# Patient Record
Sex: MALE | Race: WHITE | Employment: FULL TIME | URBAN - METROPOLITAN AREA
[De-identification: names, ages, dates, MRNs, and addresses within clinical notes are randomized per-mention and may not be internally consistent; named-entity substitution may affect disease eponyms.]

---

## 2023-04-04 ENCOUNTER — HOSPITAL ENCOUNTER (EMERGENCY)
Facility: HOSPITAL | Age: 53
Discharge: HOME | End: 2023-04-05
Attending: EMERGENCY MEDICINE
Payer: COMMERCIAL

## 2023-04-04 ENCOUNTER — APPOINTMENT (OUTPATIENT)
Dept: CT IMAGING | Facility: HOSPITAL | Age: 53
End: 2023-04-04
Attending: EMERGENCY MEDICINE
Payer: COMMERCIAL

## 2023-04-04 DIAGNOSIS — R31.9 HEMATURIA, UNSPECIFIED TYPE: ICD-10-CM

## 2023-04-04 DIAGNOSIS — N30.91 HEMORRHAGIC CYSTITIS: Primary | ICD-10-CM

## 2023-04-04 DIAGNOSIS — N20.0 RENAL STONE: ICD-10-CM

## 2023-04-04 DIAGNOSIS — N28.1 RENAL CYST: ICD-10-CM

## 2023-04-04 LAB
ALBUMIN SERPL-MCNC: 3.7 G/DL (ref 3.4–5)
ALBUMIN/GLOB SERPL: 1 {RATIO} (ref 1–2)
ALP LIVER SERPL-CCNC: 61 U/L
ALT SERPL-CCNC: 30 U/L
ANION GAP SERPL CALC-SCNC: 2 MMOL/L (ref 0–18)
AST SERPL-CCNC: 13 U/L (ref 15–37)
BASOPHILS # BLD AUTO: 0.06 X10(3) UL (ref 0–0.2)
BASOPHILS NFR BLD AUTO: 0.5 %
BILIRUB SERPL-MCNC: 0.4 MG/DL (ref 0.1–2)
BILIRUB UR QL STRIP.AUTO: NEGATIVE
BUN BLD-MCNC: 18 MG/DL (ref 7–18)
CALCIUM BLD-MCNC: 9.2 MG/DL (ref 8.5–10.1)
CHLORIDE SERPL-SCNC: 112 MMOL/L (ref 98–112)
CO2 SERPL-SCNC: 25 MMOL/L (ref 21–32)
CREAT BLD-MCNC: 0.93 MG/DL
EOSINOPHIL # BLD AUTO: 0.25 X10(3) UL (ref 0–0.7)
EOSINOPHIL NFR BLD AUTO: 2.1 %
ERYTHROCYTE [DISTWIDTH] IN BLOOD BY AUTOMATED COUNT: 13.6 %
GFR SERPLBLD BASED ON 1.73 SQ M-ARVRAT: 98 ML/MIN/1.73M2 (ref 60–?)
GLOBULIN PLAS-MCNC: 3.6 G/DL (ref 2.8–4.4)
GLUCOSE BLD-MCNC: 109 MG/DL (ref 70–99)
GLUCOSE UR STRIP.AUTO-MCNC: NEGATIVE MG/DL
HCT VFR BLD AUTO: 49.8 %
HGB BLD-MCNC: 16.9 G/DL
IMM GRANULOCYTES # BLD AUTO: 0.04 X10(3) UL (ref 0–1)
IMM GRANULOCYTES NFR BLD: 0.3 %
KETONES UR STRIP.AUTO-MCNC: NEGATIVE MG/DL
LYMPHOCYTES # BLD AUTO: 3.76 X10(3) UL (ref 1–4)
LYMPHOCYTES NFR BLD AUTO: 31.3 %
MCH RBC QN AUTO: 30.5 PG (ref 26–34)
MCHC RBC AUTO-ENTMCNC: 33.9 G/DL (ref 31–37)
MCV RBC AUTO: 89.7 FL
MONOCYTES # BLD AUTO: 0.89 X10(3) UL (ref 0.1–1)
MONOCYTES NFR BLD AUTO: 7.4 %
NEUTROPHILS # BLD AUTO: 7 X10 (3) UL (ref 1.5–7.7)
NEUTROPHILS # BLD AUTO: 7 X10(3) UL (ref 1.5–7.7)
NEUTROPHILS NFR BLD AUTO: 58.4 %
NITRITE UR QL STRIP.AUTO: NEGATIVE
OSMOLALITY SERPL CALC.SUM OF ELEC: 290 MOSM/KG (ref 275–295)
PH UR STRIP.AUTO: 6 [PH] (ref 5–8)
PLATELET # BLD AUTO: 299 10(3)UL (ref 150–450)
POTASSIUM SERPL-SCNC: 3.9 MMOL/L (ref 3.5–5.1)
PROT SERPL-MCNC: 7.3 G/DL (ref 6.4–8.2)
PROT UR STRIP.AUTO-MCNC: 100 MG/DL
RBC # BLD AUTO: 5.55 X10(6)UL
RBC #/AREA URNS AUTO: >10 /HPF
SODIUM SERPL-SCNC: 139 MMOL/L (ref 136–145)
SP GR UR STRIP.AUTO: 1.02 (ref 1–1.03)
UROBILINOGEN UR STRIP.AUTO-MCNC: <2 MG/DL
WBC # BLD AUTO: 12 X10(3) UL (ref 4–11)

## 2023-04-04 PROCEDURE — 81001 URINALYSIS AUTO W/SCOPE: CPT | Performed by: EMERGENCY MEDICINE

## 2023-04-04 PROCEDURE — 80053 COMPREHEN METABOLIC PANEL: CPT | Performed by: EMERGENCY MEDICINE

## 2023-04-04 PROCEDURE — 87086 URINE CULTURE/COLONY COUNT: CPT | Performed by: EMERGENCY MEDICINE

## 2023-04-04 PROCEDURE — 85025 COMPLETE CBC W/AUTO DIFF WBC: CPT

## 2023-04-04 PROCEDURE — 81001 URINALYSIS AUTO W/SCOPE: CPT

## 2023-04-04 PROCEDURE — 80053 COMPREHEN METABOLIC PANEL: CPT

## 2023-04-04 PROCEDURE — 99285 EMERGENCY DEPT VISIT HI MDM: CPT

## 2023-04-04 PROCEDURE — 85025 COMPLETE CBC W/AUTO DIFF WBC: CPT | Performed by: EMERGENCY MEDICINE

## 2023-04-04 PROCEDURE — 51798 US URINE CAPACITY MEASURE: CPT

## 2023-04-04 PROCEDURE — 36415 COLL VENOUS BLD VENIPUNCTURE: CPT

## 2023-04-04 PROCEDURE — 99284 EMERGENCY DEPT VISIT MOD MDM: CPT

## 2023-04-04 PROCEDURE — 74177 CT ABD & PELVIS W/CONTRAST: CPT | Performed by: EMERGENCY MEDICINE

## 2023-04-05 VITALS
RESPIRATION RATE: 20 BRPM | HEART RATE: 84 BPM | BODY MASS INDEX: 36.88 KG/M2 | DIASTOLIC BLOOD PRESSURE: 84 MMHG | SYSTOLIC BLOOD PRESSURE: 137 MMHG | OXYGEN SATURATION: 99 % | WEIGHT: 235 LBS | TEMPERATURE: 98 F | HEIGHT: 67 IN

## 2023-04-05 RX ORDER — SULFAMETHOXAZOLE AND TRIMETHOPRIM 800; 160 MG/1; MG/1
1 TABLET ORAL 2 TIMES DAILY
Qty: 20 TABLET | Refills: 0 | Status: SHIPPED | OUTPATIENT
Start: 2023-04-05 | End: 2023-04-15

## 2023-04-05 NOTE — ED INITIAL ASSESSMENT (HPI)
Pt presents with hematuria for 2 months intermittently. Seen at PCP today and was told to follow up with urology. Pt reports bilateral flank \"soreness\".  Denies abd pain, n/v/d, or fevers

## 2023-04-05 NOTE — Clinical Note
Use the antibiotics as prescribed. Return if you develop any worrisome symptoms such as urinary retention. Or significant pain or any other systemic complaints. Otherwise follow-up with urology by calling the office tomorrow.

## 2023-04-06 ENCOUNTER — OFFICE VISIT (OUTPATIENT)
Dept: SURGERY | Facility: CLINIC | Age: 53
End: 2023-04-06

## 2023-04-06 VITALS — HEART RATE: 81 BPM | SYSTOLIC BLOOD PRESSURE: 137 MMHG | DIASTOLIC BLOOD PRESSURE: 84 MMHG

## 2023-04-06 DIAGNOSIS — R31.0 GROSS HEMATURIA: Primary | ICD-10-CM

## 2023-04-06 PROCEDURE — 99203 OFFICE O/P NEW LOW 30 MIN: CPT | Performed by: UROLOGY

## 2023-04-06 PROCEDURE — 3075F SYST BP GE 130 - 139MM HG: CPT | Performed by: UROLOGY

## 2023-04-06 PROCEDURE — 3079F DIAST BP 80-89 MM HG: CPT | Performed by: UROLOGY

## 2023-04-11 ENCOUNTER — PROCEDURE (OUTPATIENT)
Dept: SURGERY | Facility: CLINIC | Age: 53
End: 2023-04-11

## 2023-04-11 ENCOUNTER — TELEPHONE (OUTPATIENT)
Dept: SURGERY | Facility: CLINIC | Age: 53
End: 2023-04-11

## 2023-04-11 VITALS — SYSTOLIC BLOOD PRESSURE: 132 MMHG | DIASTOLIC BLOOD PRESSURE: 88 MMHG | HEART RATE: 86 BPM

## 2023-04-11 DIAGNOSIS — D49.4 BLADDER TUMOR: Primary | ICD-10-CM

## 2023-04-11 PROCEDURE — 3075F SYST BP GE 130 - 139MM HG: CPT | Performed by: UROLOGY

## 2023-04-11 PROCEDURE — 52000 CYSTOURETHROSCOPY: CPT | Performed by: UROLOGY

## 2023-04-11 PROCEDURE — 3079F DIAST BP 80-89 MM HG: CPT | Performed by: UROLOGY

## 2023-04-11 NOTE — TELEPHONE ENCOUNTER
Hi!  This patient needs a transurethral resection of prostate. Should be booked for 2 hours in the next 1-2 weeks. Needs labs as ordered below. Thanks! [facility]    Urology Surgery Scheduling Request    Location: [address] [place] [address] OR    Surgeon: Zacarias Verdugo MD    Asst. Surgeon: N/A    Diagnosis: Bladder tumor    Procedure: Cystoscopy transurethral resection of bladder tumors    Anesthesia: General     Time Frame: 1-2 weeks    Time needed: 2 hours    Special Equipment: Resectoscope    On Call to OR: Ancef     Admission: Day Surgery    Pre-op Testing: CBC, CMP, PT/INR and Urine Culture     Need Pre-op Clearance: none    Estimated Post Op/Follow Up Appt: tbd.     Africa Adams MD

## 2023-04-11 NOTE — PROCEDURES
CYSTOSCOPY (MALE)    PRE-OP DIAGNOSIS: gross hematuria    POST-OP DIAGNOSIS: same    PROCEDURE: Cystsocopy    SURGEON: Ina Deleon MD    ASSISTANT: none     EBL: minimal    FINDINGS:   1. Urethra: No meatal stenosis, no anterior or posterior urethral strictures, open bladder neck   2. Prostate: Bilobar coaptation with mild intravesical component   3. Bladder: Bilateral ureteral orifices in orthotopic position with efflux, numerous papillary tumors along the entire dome of the bladder  4. Retroflexion: Mild intravesical component of prostate   5. Other findings: none    INDICATIONS: gross hematuria. Cytology positive. CT with IV contrast with bladder wall thickening. PROCEDURE: Patient was brought to the procedure suite and a timeout was performed identifying the patient,  and procedure being performed. The risks of the procedure were once again detailed to the patient including bleeding, infection, dysuria. The patient agreed to proceed. The patient had a negative urinalysis. No antibiotics were given to patient prior to this procedure. He was placed in a supine position on the table and a flexible cystoscope was inserted per urethra. There were no obvious urethral strictures in the anterior, membranous or posterior urethra. The prostate appeared to have mild bilobar coaptation. .  Once in the bladder we performed a full diagnostic/surveillance cystoscopy which demonstrated multiple papillary tumors along the dome of the bladder. On retroflexion we noted no abnormalities. The scope was then carefully removed and once again no urethral abnormalities were noted. There were no complications after this procedure and the patient tolerated the procedure without issue. IMPRESSION: cystoscopy with multiple papillary bladder tumors along the dome of the bladder. Will need TURBT. I discussed that transurethral resection of a bladder tumor is performed in the operating room.   We would use electrocautery to resect the abnormal mass. He may have a catheter in place after the surgery for about 7 days. The catheter would be removed in the clinic after that point. I did discuss that in some situations we would keep the patient overnight for observation. I reviewed the risks of the procedure including bleeding, severe bleeding causing clot retention and requiring take back to the operating room, infection, damage to surrounding structures (urethra, prostate, bladder, ureteral orifices), possible need for stent if the ureteral orifice were to be resected, need for additional procedures, anesthesia complications (cardio pulmonary complications), sepsis, inability to urinate/persistent urinary retention. Patient would follow-up with me in clinic to discuss the pathology results and possibly remove his catheter if it is placed in about 1 week. The patient knows that they may get the pathology results prior to my review, however understands that I will discuss these results in person. PLAN:    1. OR: transurethral resection of bladder tumor, bilateral retrograde pyleograms  2. CBC, CHEM7, INR, urine culture  3.  Hold NSAIDs

## 2023-04-11 NOTE — TELEPHONE ENCOUNTER
Spoke with patient, scheduled Cystoscopy transurethral resection of bladder tumors, Tuesday 05/30/2023, went over pre-op/lab instructions, all sent to his my chart.

## 2023-04-11 NOTE — PROGRESS NOTES
Hi!  This patient needs a transurethral resection of prostate. Should be booked for *** hours in the next 1-2 weeks. Needs labs as ordered below. Thanks! [facility]    Urology Surgery Scheduling Request    Location: [address] [place] [address] OR    Surgeon: Sammy Lozano MD    Asst. Surgeon: N/A    Diagnosis: Bladder tumor    Procedure: Cystoscopy transurethral resection of bladder tumor ***    Anesthesia: General     Time Frame: 1-2 weeks    Time needed: ***    Special Equipment: Resectoscope    On Call to OR: Anc ***    Admission: Day Surgery    Pre-op Testing: CBC, CMP, PT/INR and Urine Culture     Need Pre-op Clearance: none    Estimated Post Op/Follow Up Appt: tbd.     Evelin Aguayo MD

## 2023-04-12 ENCOUNTER — TELEPHONE (OUTPATIENT)
Dept: SURGERY | Facility: CLINIC | Age: 53
End: 2023-04-12

## 2023-04-12 DIAGNOSIS — C67.9 MALIGNANT NEOPLASM OF URINARY BLADDER, UNSPECIFIED SITE (HCC): Primary | ICD-10-CM

## 2023-04-12 NOTE — TELEPHONE ENCOUNTER
Robin Kidney, per scheduling, Thursday 04/27/2023, or  is available only at 3:30, please advise if I can add patient.

## 2023-04-12 NOTE — TELEPHONE ENCOUNTER
Spoke with patient, informed surgery to be moved to Thursday 05/11/2023, patient verbalized understanding

## 2023-04-13 NOTE — TELEPHONE ENCOUNTER
Spoke with patient, informed that surgery, moved to Thursday 04/27/2023, patient verbalized understanding.

## 2023-04-18 ENCOUNTER — LAB ENCOUNTER (OUTPATIENT)
Dept: LAB | Facility: HOSPITAL | Age: 53
End: 2023-04-18
Attending: UROLOGY
Payer: COMMERCIAL

## 2023-04-18 DIAGNOSIS — D49.4 BLADDER TUMOR: ICD-10-CM

## 2023-04-18 LAB
ANION GAP SERPL CALC-SCNC: 4 MMOL/L (ref 0–18)
BUN BLD-MCNC: 20 MG/DL (ref 7–18)
BUN/CREAT SERPL: 19.6 (ref 10–20)
CALCIUM BLD-MCNC: 9.2 MG/DL (ref 8.5–10.1)
CHLORIDE SERPL-SCNC: 110 MMOL/L (ref 98–112)
CO2 SERPL-SCNC: 27 MMOL/L (ref 21–32)
CREAT BLD-MCNC: 1.02 MG/DL
DEPRECATED RDW RBC AUTO: 45 FL (ref 35.1–46.3)
ERYTHROCYTE [DISTWIDTH] IN BLOOD BY AUTOMATED COUNT: 13.2 % (ref 11–15)
FASTING STATUS PATIENT QL REPORTED: NO
GFR SERPLBLD BASED ON 1.73 SQ M-ARVRAT: 87 ML/MIN/1.73M2 (ref 60–?)
GLUCOSE BLD-MCNC: 90 MG/DL (ref 70–99)
HCT VFR BLD AUTO: 45.6 %
HGB BLD-MCNC: 15 G/DL
INR BLD: 1.02 (ref 0.85–1.16)
MCH RBC QN AUTO: 30.1 PG (ref 26–34)
MCHC RBC AUTO-ENTMCNC: 32.9 G/DL (ref 31–37)
MCV RBC AUTO: 91.4 FL
OSMOLALITY SERPL CALC.SUM OF ELEC: 294 MOSM/KG (ref 275–295)
PLATELET # BLD AUTO: 296 10(3)UL (ref 150–450)
POTASSIUM SERPL-SCNC: 4.1 MMOL/L (ref 3.5–5.1)
PROTHROMBIN TIME: 13.3 SECONDS (ref 11.6–14.8)
RBC # BLD AUTO: 4.99 X10(6)UL
SODIUM SERPL-SCNC: 141 MMOL/L (ref 136–145)
WBC # BLD AUTO: 9.5 X10(3) UL (ref 4–11)

## 2023-04-18 PROCEDURE — 80048 BASIC METABOLIC PNL TOTAL CA: CPT

## 2023-04-18 PROCEDURE — 36415 COLL VENOUS BLD VENIPUNCTURE: CPT

## 2023-04-18 PROCEDURE — 85610 PROTHROMBIN TIME: CPT

## 2023-04-18 PROCEDURE — 85027 COMPLETE CBC AUTOMATED: CPT

## 2023-04-26 RX ORDER — LISINOPRIL 20 MG/1
20 TABLET ORAL DAILY
COMMUNITY

## 2023-04-27 ENCOUNTER — ANESTHESIA EVENT (OUTPATIENT)
Dept: SURGERY | Facility: HOSPITAL | Age: 53
End: 2023-04-27
Payer: COMMERCIAL

## 2023-04-27 ENCOUNTER — TELEPHONE (OUTPATIENT)
Dept: SURGERY | Facility: CLINIC | Age: 53
End: 2023-04-27

## 2023-04-27 ENCOUNTER — ANESTHESIA (OUTPATIENT)
Dept: SURGERY | Facility: HOSPITAL | Age: 53
End: 2023-04-27
Payer: COMMERCIAL

## 2023-04-27 ENCOUNTER — HOSPITAL ENCOUNTER (OUTPATIENT)
Facility: HOSPITAL | Age: 53
Setting detail: HOSPITAL OUTPATIENT SURGERY
Discharge: HOME | End: 2023-04-27
Attending: UROLOGY | Admitting: UROLOGY
Payer: COMMERCIAL

## 2023-04-27 VITALS
HEART RATE: 76 BPM | WEIGHT: 235 LBS | TEMPERATURE: 98 F | HEIGHT: 67 IN | RESPIRATION RATE: 16 BRPM | DIASTOLIC BLOOD PRESSURE: 84 MMHG | BODY MASS INDEX: 36.88 KG/M2 | SYSTOLIC BLOOD PRESSURE: 135 MMHG | OXYGEN SATURATION: 96 %

## 2023-04-27 DIAGNOSIS — D49.4 BLADDER TUMOR: ICD-10-CM

## 2023-04-27 LAB
GLUCOSE BLDC GLUCOMTR-MCNC: 100 MG/DL (ref 70–99)
GLUCOSE BLDC GLUCOMTR-MCNC: 113 MG/DL (ref 70–99)

## 2023-04-27 PROCEDURE — 52240 CYSTOSCOPY AND TREATMENT: CPT | Performed by: UROLOGY

## 2023-04-27 PROCEDURE — 0TBB8ZZ EXCISION OF BLADDER, VIA NATURAL OR ARTIFICIAL OPENING ENDOSCOPIC: ICD-10-PCS | Performed by: UROLOGY

## 2023-04-27 RX ORDER — MIDAZOLAM HYDROCHLORIDE 1 MG/ML
INJECTION INTRAMUSCULAR; INTRAVENOUS AS NEEDED
Status: DISCONTINUED | OUTPATIENT
Start: 2023-04-27 | End: 2023-04-27 | Stop reason: SURG

## 2023-04-27 RX ORDER — SODIUM CHLORIDE, SODIUM LACTATE, POTASSIUM CHLORIDE, CALCIUM CHLORIDE 600; 310; 30; 20 MG/100ML; MG/100ML; MG/100ML; MG/100ML
INJECTION, SOLUTION INTRAVENOUS CONTINUOUS
Status: DISCONTINUED | OUTPATIENT
Start: 2023-04-27 | End: 2023-04-27

## 2023-04-27 RX ORDER — ACETAMINOPHEN 500 MG
1000 TABLET ORAL ONCE
Status: COMPLETED | OUTPATIENT
Start: 2023-04-27 | End: 2023-04-27

## 2023-04-27 RX ORDER — ONDANSETRON 2 MG/ML
INJECTION INTRAMUSCULAR; INTRAVENOUS AS NEEDED
Status: DISCONTINUED | OUTPATIENT
Start: 2023-04-27 | End: 2023-04-27 | Stop reason: SURG

## 2023-04-27 RX ORDER — HYDROMORPHONE HYDROCHLORIDE 1 MG/ML
0.2 INJECTION, SOLUTION INTRAMUSCULAR; INTRAVENOUS; SUBCUTANEOUS EVERY 5 MIN PRN
Status: DISCONTINUED | OUTPATIENT
Start: 2023-04-27 | End: 2023-04-27

## 2023-04-27 RX ORDER — OXYBUTYNIN CHLORIDE 5 MG/1
10 TABLET ORAL ONCE AS NEEDED
Status: DISCONTINUED | OUTPATIENT
Start: 2023-04-27 | End: 2023-04-27

## 2023-04-27 RX ORDER — METOCLOPRAMIDE 10 MG/1
10 TABLET ORAL ONCE
Status: COMPLETED | OUTPATIENT
Start: 2023-04-27 | End: 2023-04-27

## 2023-04-27 RX ORDER — CEFAZOLIN SODIUM/WATER 2 G/20 ML
2 SYRINGE (ML) INTRAVENOUS ONCE
Status: COMPLETED | OUTPATIENT
Start: 2023-04-27 | End: 2023-04-27

## 2023-04-27 RX ORDER — ROCURONIUM BROMIDE 10 MG/ML
INJECTION, SOLUTION INTRAVENOUS AS NEEDED
Status: DISCONTINUED | OUTPATIENT
Start: 2023-04-27 | End: 2023-04-27 | Stop reason: SURG

## 2023-04-27 RX ORDER — NALOXONE HYDROCHLORIDE 0.4 MG/ML
80 INJECTION, SOLUTION INTRAMUSCULAR; INTRAVENOUS; SUBCUTANEOUS AS NEEDED
Status: DISCONTINUED | OUTPATIENT
Start: 2023-04-27 | End: 2023-04-27

## 2023-04-27 RX ORDER — MORPHINE SULFATE 10 MG/ML
6 INJECTION, SOLUTION INTRAMUSCULAR; INTRAVENOUS EVERY 10 MIN PRN
Status: DISCONTINUED | OUTPATIENT
Start: 2023-04-27 | End: 2023-04-27

## 2023-04-27 RX ORDER — HYDROMORPHONE HYDROCHLORIDE 1 MG/ML
0.4 INJECTION, SOLUTION INTRAMUSCULAR; INTRAVENOUS; SUBCUTANEOUS EVERY 5 MIN PRN
Status: DISCONTINUED | OUTPATIENT
Start: 2023-04-27 | End: 2023-04-27

## 2023-04-27 RX ORDER — SENNA AND DOCUSATE SODIUM 50; 8.6 MG/1; MG/1
1 TABLET, FILM COATED ORAL DAILY
Qty: 30 TABLET | Refills: 11 | Status: SHIPPED | OUTPATIENT
Start: 2023-04-27

## 2023-04-27 RX ORDER — LIDOCAINE HYDROCHLORIDE 10 MG/ML
INJECTION, SOLUTION EPIDURAL; INFILTRATION; INTRACAUDAL; PERINEURAL AS NEEDED
Status: DISCONTINUED | OUTPATIENT
Start: 2023-04-27 | End: 2023-04-27 | Stop reason: SURG

## 2023-04-27 RX ORDER — MORPHINE SULFATE 4 MG/ML
4 INJECTION, SOLUTION INTRAMUSCULAR; INTRAVENOUS EVERY 10 MIN PRN
Status: DISCONTINUED | OUTPATIENT
Start: 2023-04-27 | End: 2023-04-27

## 2023-04-27 RX ORDER — OXYBUTYNIN CHLORIDE 5 MG/1
5 TABLET ORAL 3 TIMES DAILY PRN
Qty: 42 TABLET | Refills: 0 | Status: SHIPPED | OUTPATIENT
Start: 2023-04-27 | End: 2023-05-11

## 2023-04-27 RX ORDER — SULFAMETHOXAZOLE AND TRIMETHOPRIM 800; 160 MG/1; MG/1
1 TABLET ORAL 2 TIMES DAILY
Qty: 6 TABLET | Refills: 0 | Status: SHIPPED | OUTPATIENT
Start: 2023-04-27 | End: 2023-04-30

## 2023-04-27 RX ORDER — FAMOTIDINE 20 MG/1
20 TABLET, FILM COATED ORAL ONCE
Status: COMPLETED | OUTPATIENT
Start: 2023-04-27 | End: 2023-04-27

## 2023-04-27 RX ORDER — OXYBUTYNIN CHLORIDE 5 MG/1
5 TABLET ORAL 3 TIMES DAILY
Status: DISCONTINUED | OUTPATIENT
Start: 2023-04-28 | End: 2023-04-27

## 2023-04-27 RX ORDER — HYDROMORPHONE HYDROCHLORIDE 1 MG/ML
0.6 INJECTION, SOLUTION INTRAMUSCULAR; INTRAVENOUS; SUBCUTANEOUS EVERY 5 MIN PRN
Status: DISCONTINUED | OUTPATIENT
Start: 2023-04-27 | End: 2023-04-27

## 2023-04-27 RX ORDER — DEXAMETHASONE SODIUM PHOSPHATE 4 MG/ML
VIAL (ML) INJECTION AS NEEDED
Status: DISCONTINUED | OUTPATIENT
Start: 2023-04-27 | End: 2023-04-27 | Stop reason: SURG

## 2023-04-27 RX ORDER — MORPHINE SULFATE 4 MG/ML
2 INJECTION, SOLUTION INTRAMUSCULAR; INTRAVENOUS EVERY 10 MIN PRN
Status: DISCONTINUED | OUTPATIENT
Start: 2023-04-27 | End: 2023-04-27

## 2023-04-27 RX ORDER — OXYBUTYNIN CHLORIDE 5 MG/1
5 TABLET ORAL 3 TIMES DAILY
Status: DISCONTINUED | OUTPATIENT
Start: 2023-04-27 | End: 2023-04-27

## 2023-04-27 RX ORDER — LABETALOL HYDROCHLORIDE 5 MG/ML
INJECTION, SOLUTION INTRAVENOUS AS NEEDED
Status: DISCONTINUED | OUTPATIENT
Start: 2023-04-27 | End: 2023-04-27 | Stop reason: SURG

## 2023-04-27 RX ORDER — POLYETHYLENE GLYCOL 3350 17 G/17G
17 POWDER, FOR SOLUTION ORAL DAILY PRN
Qty: 30 EACH | Refills: 11 | Status: SHIPPED | OUTPATIENT
Start: 2023-04-27

## 2023-04-27 RX ORDER — DOCUSATE SODIUM 100 MG/1
100 CAPSULE, LIQUID FILLED ORAL 2 TIMES DAILY
Qty: 30 CAPSULE | Refills: 11 | Status: SHIPPED | OUTPATIENT
Start: 2023-04-27

## 2023-04-27 RX ADMIN — DEXAMETHASONE SODIUM PHOSPHATE 8 MG: 4 MG/ML VIAL (ML) INJECTION at 16:21:00

## 2023-04-27 RX ADMIN — SODIUM CHLORIDE, SODIUM LACTATE, POTASSIUM CHLORIDE, CALCIUM CHLORIDE: 600; 310; 30; 20 INJECTION, SOLUTION INTRAVENOUS at 16:42:00

## 2023-04-27 RX ADMIN — CEFAZOLIN SODIUM/WATER 2 G: 2 G/20 ML SYRINGE (ML) INTRAVENOUS at 16:22:00

## 2023-04-27 RX ADMIN — SODIUM CHLORIDE, SODIUM LACTATE, POTASSIUM CHLORIDE, CALCIUM CHLORIDE: 600; 310; 30; 20 INJECTION, SOLUTION INTRAVENOUS at 16:21:00

## 2023-04-27 RX ADMIN — MIDAZOLAM HYDROCHLORIDE 2 MG: 1 INJECTION INTRAMUSCULAR; INTRAVENOUS at 16:09:00

## 2023-04-27 RX ADMIN — ROCURONIUM BROMIDE 20 MG: 10 INJECTION, SOLUTION INTRAVENOUS at 16:44:00

## 2023-04-27 RX ADMIN — LABETALOL HYDROCHLORIDE 10 MG: 5 INJECTION, SOLUTION INTRAVENOUS at 16:28:00

## 2023-04-27 RX ADMIN — LIDOCAINE HYDROCHLORIDE 50 MG: 10 INJECTION, SOLUTION EPIDURAL; INFILTRATION; INTRACAUDAL; PERINEURAL at 16:13:00

## 2023-04-27 RX ADMIN — SODIUM CHLORIDE, SODIUM LACTATE, POTASSIUM CHLORIDE, CALCIUM CHLORIDE: 600; 310; 30; 20 INJECTION, SOLUTION INTRAVENOUS at 16:30:00

## 2023-04-27 RX ADMIN — ONDANSETRON 4 MG: 2 INJECTION INTRAMUSCULAR; INTRAVENOUS at 17:25:00

## 2023-04-27 RX ADMIN — ROCURONIUM BROMIDE 30 MG: 10 INJECTION, SOLUTION INTRAVENOUS at 16:21:00

## 2023-04-27 NOTE — TELEPHONE ENCOUNTER
Hi all he will need a catheter removal in 10-14 days and pathology discussion with me same day    Terry Cruz

## 2023-04-27 NOTE — ANESTHESIA POSTPROCEDURE EVALUATION
Patient: Rekha Edouard    Procedure Summary     Date: 04/27/23 Room / Location: [address] [place] [address] MAIN OR 14 / [address] [place] [address] MAIN OR    Anesthesia Start: 3781 Anesthesia Stop: 7936    Procedure: Cystoscopy transurethral resection of bladder tumors Diagnosis:       Bladder tumor      (Bladder tumor [D49.4])    Surgeons: Shira Bowman MD Anesthesiologist: Noni Sandoval MD    Anesthesia Type: general ASA Status: 2          Anesthesia Type: general    Vitals Value Taken Time   /96 04/27/23 1740   Temp  04/27/23 1740   Pulse 85 04/27/23 1739   Resp 14 04/27/23 1740   SpO2 96 % 04/27/23 1739   Vitals shown include unvalidated device data.     [address] [place] [address] AN Post Evaluation:   Patient Evaluated in PACU  Patient Participation: complete - patient participated  Level of Consciousness: awake  Pain Management: adequate  Airway Patency:patent  Yes    Cardiovascular Status: acceptable  Respiratory Status: acceptable  Postoperative Hydration acceptable      Keon Harding MD  4/27/2023 5:40 PM
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
## 2023-04-27 NOTE — ANESTHESIA PROCEDURE NOTES
Airway  Date/Time: 4/27/2023 4:14 PM  Urgency: elective    Airway not difficult    General Information and Staff    Patient location during procedure: OR  Anesthesiologist: Petra Nicholson MD  Performed: anesthesiologist   Performed by: Petra Nicholson MD  Authorized by: Petra Nicholson MD      Indications and Patient Condition  Indications for airway management: anesthesia  Sedation level: deep  Preoxygenated: yes  Patient position: sniffing  Mask difficulty assessment: 1 - vent by mask    Final Airway Details  Final airway type: endotracheal airway      Successful airway: ETT  Cuffed: yes   Successful intubation technique: Video laryngoscopy  Endotracheal tube insertion site: oral  Blade: GlideScope  Blade size: #4  ETT size (mm): 7.5    Cormack-Lehane Classification: grade I - full view of glottis  Placement verified by: chest auscultation and capnometry   Cuff volume (mL): 8  Measured from: lips  ETT to lips (cm): 22  Number of attempts at approach: 1  Number of other approaches attempted: 0

## 2023-04-28 NOTE — TELEPHONE ENCOUNTER
I called pt and informed him of the orders and instructions in AR's msg as stated below and pt verbalized understanding and we arranged an N/V appt for Fri. 5/12 at 8:50 am and his discussion appt at 9 am.

## 2023-05-01 ENCOUNTER — TELEPHONE (OUTPATIENT)
Dept: SURGERY | Facility: CLINIC | Age: 53
End: 2023-05-01

## 2023-05-01 NOTE — TELEPHONE ENCOUNTER
Patients spouse requesting call back. Would like to either get a sooner appointment or get a call back with pathology results. They do not want to wait that long for results per spouse.  Please advise

## 2023-05-03 ENCOUNTER — TELEPHONE (OUTPATIENT)
Dept: SURGERY | Facility: CLINIC | Age: 53
End: 2023-05-03

## 2023-05-03 ENCOUNTER — OFFICE VISIT (OUTPATIENT)
Dept: SURGERY | Facility: CLINIC | Age: 53
End: 2023-05-03

## 2023-05-03 DIAGNOSIS — C67.9 MALIGNANT NEOPLASM OF URINARY BLADDER, UNSPECIFIED SITE (HCC): ICD-10-CM

## 2023-05-03 DIAGNOSIS — C67.9 MALIGNANT NEOPLASM OF URINARY BLADDER, UNSPECIFIED SITE (HCC): Primary | ICD-10-CM

## 2023-05-03 DIAGNOSIS — D49.4 BLADDER TUMOR: Primary | ICD-10-CM

## 2023-05-03 PROCEDURE — 99213 OFFICE O/P EST LOW 20 MIN: CPT | Performed by: UROLOGY

## 2023-05-03 NOTE — TELEPHONE ENCOUNTER
Hi!  This patient needs a transurethral resection of bladder tumor. Should be booked for 1 hours on may 19 at 3pm Needs labs as ordered below. Thanks! 309 \[facility]\""    Urology Surgery Scheduling Request    Location: [address] [place] [address] OR    Surgeon: Lyle Valdez MD    Asst. Surgeon: N/A    Diagnosis: Bladder tumor    Procedure: Cystoscopy transurethral resection of bladder tumor, restaging    Anesthesia: General     Time Frame: may 19    Time needed: 1 hour    Special Equipment: Resectoscope    On Call to OR: Ancef     Admission: Day Surgery    Pre-op Testing: Urine Culture     Need Pre-op Clearance: none    Estimated Post Op/Follow Up Appt: suzanne Suarez MD

## 2023-05-04 NOTE — TELEPHONE ENCOUNTER
Spoke with patient, scheduled Cystoscopy restaging transurethral resection of bladder tumor, Friday 05/19/2023, went over pre-op/lab instructions, all sent to patient' my chart

## 2023-05-08 ENCOUNTER — NURSE ONLY (OUTPATIENT)
Dept: SURGERY | Facility: CLINIC | Age: 53
End: 2023-05-08

## 2023-05-08 VITALS — HEART RATE: 72 BPM | DIASTOLIC BLOOD PRESSURE: 78 MMHG | SYSTOLIC BLOOD PRESSURE: 134 MMHG | RESPIRATION RATE: 16 BRPM

## 2023-05-08 DIAGNOSIS — R33.9 URINARY RETENTION: Primary | ICD-10-CM

## 2023-05-08 PROCEDURE — 51700 IRRIGATION OF BLADDER: CPT | Performed by: UROLOGY

## 2023-05-08 PROCEDURE — 3075F SYST BP GE 130 - 139MM HG: CPT | Performed by: UROLOGY

## 2023-05-08 PROCEDURE — 3078F DIAST BP <80 MM HG: CPT | Performed by: UROLOGY

## 2023-05-08 NOTE — PROGRESS NOTES
I called the pt into the exam room and introduced myself and verified the spelling of his last name and his . I explained that Allen Almendarez gave orders to conduct a voiding trial /decath and I explained the procedure and pt agreed to proceed. I then assisted the pt onto the exam table and also to lower his bottom clothing to his ankles. I then placed him on the exam table in a supine position. I proceeded to detach the leg bag and extension tubing from the arceo cath and I discarded them. I then removed the arceo cath from the STAT lock at his upper inner Lt. Thigh and I then removed the STAT lock also. I then deflated the arceo cath balloon of its contents ( 10 ml) and I cleansed the end of the arceo cath with a couple of alcohol wipes and then inserted the barrel of a sarai syringe into the arceo cath. I then proceeded to fill the pt's bladder with 130 ml of 0.9 NS via gravity at which point pt stated that he felt full and thought he could urinate. I then removed the arceo cath gently and slowly and then assisted the pt to a standing position and I gave him a graduate to urinate into and I also turned on the faucet water at a trickle to help stimulate urination. Pt was immediately able to urinate 140 ml of light red clear colored urine without blood clots with a fairly good stream.  Pt c/o only slight dysuria but he tolerated it well. I explained to pt that once he gets home he should just drink a little more than he typically would but he should not force fluids. I also asked that he call if he has any urinary difficulties. I explained that urinary retention can be in the form of no urine output at all over an extended period or small amts of urine production very frequently with eventual feeling of bladder pressure and discomfort. Pt verbalized understanding and compliance. Pt has another surgery schd for .

## 2023-05-10 ENCOUNTER — LAB ENCOUNTER (OUTPATIENT)
Dept: LAB | Facility: HOSPITAL | Age: 53
End: 2023-05-10
Attending: UROLOGY
Payer: COMMERCIAL

## 2023-05-10 DIAGNOSIS — C67.9 MALIGNANT NEOPLASM OF URINARY BLADDER, UNSPECIFIED SITE (HCC): ICD-10-CM

## 2023-05-10 DIAGNOSIS — D49.4 BLADDER TUMOR: ICD-10-CM

## 2023-05-10 PROCEDURE — 87086 URINE CULTURE/COLONY COUNT: CPT

## 2023-05-18 RX ORDER — CEFAZOLIN SODIUM/WATER 2 G/20 ML
2 SYRINGE (ML) INTRAVENOUS ONCE
Status: COMPLETED | OUTPATIENT
Start: 2023-05-19 | End: 2023-05-19

## 2023-05-18 RX ORDER — CEFAZOLIN SODIUM/WATER 2 G/20 ML
2 SYRINGE (ML) INTRAVENOUS ONCE
Status: DISCONTINUED | OUTPATIENT
Start: 2023-05-18 | End: 2023-05-18

## 2023-05-19 ENCOUNTER — TELEPHONE (OUTPATIENT)
Dept: SURGERY | Facility: CLINIC | Age: 53
End: 2023-05-19

## 2023-05-19 ENCOUNTER — ANESTHESIA (OUTPATIENT)
Dept: SURGERY | Facility: HOSPITAL | Age: 53
End: 2023-05-19
Payer: COMMERCIAL

## 2023-05-19 ENCOUNTER — HOSPITAL ENCOUNTER (OUTPATIENT)
Facility: HOSPITAL | Age: 53
Setting detail: HOSPITAL OUTPATIENT SURGERY
Discharge: HOME | End: 2023-05-19
Attending: UROLOGY | Admitting: UROLOGY
Payer: COMMERCIAL

## 2023-05-19 ENCOUNTER — ANESTHESIA EVENT (OUTPATIENT)
Dept: SURGERY | Facility: HOSPITAL | Age: 53
End: 2023-05-19
Payer: COMMERCIAL

## 2023-05-19 VITALS
BODY MASS INDEX: 36.73 KG/M2 | TEMPERATURE: 97 F | HEART RATE: 66 BPM | SYSTOLIC BLOOD PRESSURE: 134 MMHG | DIASTOLIC BLOOD PRESSURE: 84 MMHG | HEIGHT: 67 IN | RESPIRATION RATE: 14 BRPM | OXYGEN SATURATION: 100 % | WEIGHT: 234 LBS

## 2023-05-19 DIAGNOSIS — C67.9 MALIGNANT NEOPLASM OF URINARY BLADDER, UNSPECIFIED SITE (HCC): ICD-10-CM

## 2023-05-19 PROCEDURE — 52235 CYSTOSCOPY AND TREATMENT: CPT | Performed by: UROLOGY

## 2023-05-19 PROCEDURE — 0TBB8ZX EXCISION OF BLADDER, VIA NATURAL OR ARTIFICIAL OPENING ENDOSCOPIC, DIAGNOSTIC: ICD-10-PCS | Performed by: UROLOGY

## 2023-05-19 PROCEDURE — 0TBB8ZZ EXCISION OF BLADDER, VIA NATURAL OR ARTIFICIAL OPENING ENDOSCOPIC: ICD-10-PCS | Performed by: UROLOGY

## 2023-05-19 RX ORDER — ESMOLOL HYDROCHLORIDE 10 MG/ML
INJECTION INTRAVENOUS AS NEEDED
Status: DISCONTINUED | OUTPATIENT
Start: 2023-05-19 | End: 2023-05-19 | Stop reason: SURG

## 2023-05-19 RX ORDER — SULFAMETHOXAZOLE AND TRIMETHOPRIM 800; 160 MG/1; MG/1
1 TABLET ORAL 2 TIMES DAILY
Qty: 6 TABLET | Refills: 0 | Status: SHIPPED | OUTPATIENT
Start: 2023-05-19 | End: 2023-05-22

## 2023-05-19 RX ORDER — HYDROMORPHONE HYDROCHLORIDE 1 MG/ML
0.6 INJECTION, SOLUTION INTRAMUSCULAR; INTRAVENOUS; SUBCUTANEOUS EVERY 5 MIN PRN
Status: DISCONTINUED | OUTPATIENT
Start: 2023-05-19 | End: 2023-05-24

## 2023-05-19 RX ORDER — GLYCOPYRROLATE 0.2 MG/ML
INJECTION, SOLUTION INTRAMUSCULAR; INTRAVENOUS AS NEEDED
Status: DISCONTINUED | OUTPATIENT
Start: 2023-05-19 | End: 2023-05-19 | Stop reason: SURG

## 2023-05-19 RX ORDER — SODIUM CHLORIDE, SODIUM LACTATE, POTASSIUM CHLORIDE, CALCIUM CHLORIDE 600; 310; 30; 20 MG/100ML; MG/100ML; MG/100ML; MG/100ML
INJECTION, SOLUTION INTRAVENOUS CONTINUOUS
Status: DISCONTINUED | OUTPATIENT
Start: 2023-05-19 | End: 2023-05-24

## 2023-05-19 RX ORDER — MORPHINE SULFATE 4 MG/ML
2 INJECTION, SOLUTION INTRAMUSCULAR; INTRAVENOUS EVERY 10 MIN PRN
Status: DISCONTINUED | OUTPATIENT
Start: 2023-05-19 | End: 2023-05-24

## 2023-05-19 RX ORDER — MIDAZOLAM HYDROCHLORIDE 1 MG/ML
INJECTION INTRAMUSCULAR; INTRAVENOUS AS NEEDED
Status: DISCONTINUED | OUTPATIENT
Start: 2023-05-19 | End: 2023-05-19 | Stop reason: SURG

## 2023-05-19 RX ORDER — ONDANSETRON 2 MG/ML
4 INJECTION INTRAMUSCULAR; INTRAVENOUS EVERY 6 HOURS PRN
Status: DISCONTINUED | OUTPATIENT
Start: 2023-05-19 | End: 2023-05-24

## 2023-05-19 RX ORDER — ACETAMINOPHEN 500 MG
1000 TABLET ORAL ONCE
Status: COMPLETED | OUTPATIENT
Start: 2023-05-19 | End: 2023-05-19

## 2023-05-19 RX ORDER — HYDROMORPHONE HYDROCHLORIDE 1 MG/ML
0.4 INJECTION, SOLUTION INTRAMUSCULAR; INTRAVENOUS; SUBCUTANEOUS EVERY 5 MIN PRN
Status: DISCONTINUED | OUTPATIENT
Start: 2023-05-19 | End: 2023-05-24

## 2023-05-19 RX ORDER — ONDANSETRON 2 MG/ML
INJECTION INTRAMUSCULAR; INTRAVENOUS AS NEEDED
Status: DISCONTINUED | OUTPATIENT
Start: 2023-05-19 | End: 2023-05-19 | Stop reason: SURG

## 2023-05-19 RX ORDER — METOCLOPRAMIDE 10 MG/1
10 TABLET ORAL ONCE
Status: COMPLETED | OUTPATIENT
Start: 2023-05-19 | End: 2023-05-19

## 2023-05-19 RX ORDER — HYDROMORPHONE HYDROCHLORIDE 1 MG/ML
0.2 INJECTION, SOLUTION INTRAMUSCULAR; INTRAVENOUS; SUBCUTANEOUS EVERY 5 MIN PRN
Status: DISCONTINUED | OUTPATIENT
Start: 2023-05-19 | End: 2023-05-24

## 2023-05-19 RX ORDER — LABETALOL HYDROCHLORIDE 5 MG/ML
5 INJECTION, SOLUTION INTRAVENOUS EVERY 5 MIN PRN
Status: DISCONTINUED | OUTPATIENT
Start: 2023-05-19 | End: 2023-05-19

## 2023-05-19 RX ORDER — DEXAMETHASONE SODIUM PHOSPHATE 4 MG/ML
VIAL (ML) INJECTION AS NEEDED
Status: DISCONTINUED | OUTPATIENT
Start: 2023-05-19 | End: 2023-05-19 | Stop reason: SURG

## 2023-05-19 RX ORDER — NALOXONE HYDROCHLORIDE 0.4 MG/ML
80 INJECTION, SOLUTION INTRAMUSCULAR; INTRAVENOUS; SUBCUTANEOUS AS NEEDED
Status: DISCONTINUED | OUTPATIENT
Start: 2023-05-19 | End: 2023-05-19

## 2023-05-19 RX ORDER — ROCURONIUM BROMIDE 10 MG/ML
INJECTION, SOLUTION INTRAVENOUS AS NEEDED
Status: DISCONTINUED | OUTPATIENT
Start: 2023-05-19 | End: 2023-05-19 | Stop reason: SURG

## 2023-05-19 RX ORDER — MORPHINE SULFATE 10 MG/ML
6 INJECTION, SOLUTION INTRAMUSCULAR; INTRAVENOUS EVERY 10 MIN PRN
Status: DISCONTINUED | OUTPATIENT
Start: 2023-05-19 | End: 2023-05-24

## 2023-05-19 RX ORDER — FAMOTIDINE 20 MG/1
20 TABLET, FILM COATED ORAL ONCE
Status: COMPLETED | OUTPATIENT
Start: 2023-05-19 | End: 2023-05-19

## 2023-05-19 RX ORDER — MORPHINE SULFATE 4 MG/ML
4 INJECTION, SOLUTION INTRAMUSCULAR; INTRAVENOUS EVERY 10 MIN PRN
Status: DISCONTINUED | OUTPATIENT
Start: 2023-05-19 | End: 2023-05-24

## 2023-05-19 RX ORDER — PROCHLORPERAZINE EDISYLATE 5 MG/ML
5 INJECTION INTRAMUSCULAR; INTRAVENOUS EVERY 8 HOURS PRN
Status: DISCONTINUED | OUTPATIENT
Start: 2023-05-19 | End: 2023-05-24

## 2023-05-19 RX ORDER — LIDOCAINE HYDROCHLORIDE 10 MG/ML
INJECTION, SOLUTION EPIDURAL; INFILTRATION; INTRACAUDAL; PERINEURAL AS NEEDED
Status: DISCONTINUED | OUTPATIENT
Start: 2023-05-19 | End: 2023-05-19 | Stop reason: SURG

## 2023-05-19 RX ADMIN — ROCURONIUM BROMIDE 10 MG: 10 INJECTION, SOLUTION INTRAVENOUS at 10:39:00

## 2023-05-19 RX ADMIN — MIDAZOLAM HYDROCHLORIDE 2 MG: 1 INJECTION INTRAMUSCULAR; INTRAVENOUS at 10:37:00

## 2023-05-19 RX ADMIN — DEXAMETHASONE SODIUM PHOSPHATE 4 MG: 4 MG/ML VIAL (ML) INJECTION at 10:48:00

## 2023-05-19 RX ADMIN — CEFAZOLIN SODIUM/WATER 2 G: 2 G/20 ML SYRINGE (ML) INTRAVENOUS at 10:45:00

## 2023-05-19 RX ADMIN — ESMOLOL HYDROCHLORIDE 10 MG: 10 INJECTION INTRAVENOUS at 10:59:00

## 2023-05-19 RX ADMIN — LIDOCAINE HYDROCHLORIDE 50 MG: 10 INJECTION, SOLUTION EPIDURAL; INFILTRATION; INTRACAUDAL; PERINEURAL at 10:39:00

## 2023-05-19 RX ADMIN — GLYCOPYRROLATE 0.2 MG: 0.2 INJECTION, SOLUTION INTRAMUSCULAR; INTRAVENOUS at 10:48:00

## 2023-05-19 RX ADMIN — ONDANSETRON 4 MG: 2 INJECTION INTRAMUSCULAR; INTRAVENOUS at 12:00:00

## 2023-05-19 RX ADMIN — ROCURONIUM BROMIDE 40 MG: 10 INJECTION, SOLUTION INTRAVENOUS at 10:45:00

## 2023-05-19 RX ADMIN — SODIUM CHLORIDE, SODIUM LACTATE, POTASSIUM CHLORIDE, CALCIUM CHLORIDE: 600; 310; 30; 20 INJECTION, SOLUTION INTRAVENOUS at 10:35:00

## 2023-05-19 NOTE — ANESTHESIA PROCEDURE NOTES
Airway  Date/Time: 5/19/2023 10:41 AM  Urgency: Elective    Airway not difficult    General Information and Staff    Patient location during procedure: OR  Anesthesiologist: Erik Mc DO  Performed: anesthesiologist   Performed by: Erik Mc DO  Authorized by: Erik Mc DO      Indications and Patient Condition  Indications for airway management: anesthesia  Spontaneous Ventilation: absent  Sedation level: deep  Preoxygenated: yes  Patient position: sniffing  Mask difficulty assessment: 1 - vent by mask    Final Airway Details  Final airway type: endotracheal airway      Successful airway: ETT  Cuffed: yes   Successful intubation technique: direct laryngoscopy  Facilitating devices/methods: intubating stylet  Endotracheal tube insertion site: oral  Blade: Reina  Blade size: #4  ETT size (mm): 7.5    Cormack-Lehane Classification: grade III - view of epiglottis only  Placement verified by: chest auscultation and capnometry   Measured from: teeth  ETT to teeth (cm): 22  Number of attempts at approach: 1  Number of other approaches attempted: 0

## 2023-05-19 NOTE — TELEPHONE ENCOUNTER
Hi all  He needs a nursing visit for voiding trial next Friday may 26    I also need a visit with him on Tuesday may 30 for pathology discussion

## 2023-05-19 NOTE — TELEPHONE ENCOUNTER
- s/w pt; identity verified with name and   - Explained to pt that Dr. Maria Del Carmen Tavarez asked us to call to schedule a voiding trial on Friday, May 26th, and a discussion with her on May 30th.    -  Offered pt a NV on  @ 9:20am, and an OV with AR on  @ 3:30pm, and pt accepted.     - Encounter complete

## 2023-05-19 NOTE — OPERATIVE REPORT
OPERATIVE REPORT  DATE OF SURGERY: 5/19/2023  PREOPERATIVE DIAGNOSIS: Bladder tumor  POSTOPERATIVE DIAGNOSIS: Same  PROCEDURE: Cystoscopy, transurethral resection of bladder tumor >5cm  SURGEON: Anthony Donahue MD  ASSISTANT: none  ANESTHESIA: General ET tube  FLUIDS: Per anesthesia  URINE OUTPUT: Not applicable  ESTIMATED BLOOD LOSS: 3cc  SPECIMENS:  1. Right lateral wall bladder tumor cold cup  2. Right lateral wall bladder tumor   3. Left lateral wall bladder tumor cold cup  4. Left lateral wall bladder tumor  5. Bladder tumor dome    CONDITION: Stable to PACU    INDICATIONS: Restaging bladder tumor    PROCEDURE: The patient was taken to the operating room and given general anesthesia and then placed in yellowfin stirrups. Patient received  Ancef antibiotic coverage before starting the case. At this point, sounds were used to dilate the meatus to [address]. The resectoscope passed easily in the meatus and into the bladder. A full cystoscopy was performed which demonstrated prior resection site and multiple raised areas of sessile tumor spanning the right lateral wall, left lateral wall and dome. We then used the resectocope loop to resect the bladder tumor in its entirity. The area was >5cm. We used the loop to fulgurate the area completely. We also took multiple cold cup biopsy sites. With the bladder decompressed there was no further bleeding. The specimens were removed and sent to pathology. A arceo catheter was placed the urine was flushed and was light pink in color. The patient was then awakened and transported to the PACU in good condition. IMPRESSION: Status post TURBT    PLAN:  1. Arceo catheter x 7 days  2.  RTC at that time for pathology discussion

## 2023-05-26 ENCOUNTER — NURSE ONLY (OUTPATIENT)
Dept: SURGERY | Facility: CLINIC | Age: 53
End: 2023-05-26

## 2023-05-26 VITALS — DIASTOLIC BLOOD PRESSURE: 75 MMHG | SYSTOLIC BLOOD PRESSURE: 119 MMHG | HEART RATE: 73 BPM

## 2023-05-26 DIAGNOSIS — D49.4 BLADDER TUMOR: ICD-10-CM

## 2023-05-26 PROCEDURE — 3074F SYST BP LT 130 MM HG: CPT | Performed by: UROLOGY

## 2023-05-26 PROCEDURE — 51700 IRRIGATION OF BLADDER: CPT | Performed by: UROLOGY

## 2023-05-26 PROCEDURE — 3078F DIAST BP <80 MM HG: CPT | Performed by: UROLOGY

## 2023-05-26 NOTE — PROGRESS NOTES
I called the pt into the exam room and introduced myself and verified his full name and his . I explained that [address] Almendarez gave orders to conduct a voiding trial /decath and I explained the procedure and pt agreed to proceed. I then assisted the pt onto the exam table and also to lower his bottom clothing to his ankles. I then placed him on the exam table in a supine position. I proceeded to detach the leg bag and extension tubing from the arceo cath and I discarded them. I then removed the arceo cath from the STAT lock at his upper inner Lt. Thigh and I then removed the STAT lock also. I then deflated the arceo cath balloon of its contents and I cleansed the end of the arceo cath with a couple of alcohol wipes and then inserted the barrel of a sarai syringe into the arceo cath. I then proceeded to fill the pt's bladder with 70 ml of 0.9 NS via gravity at which point pt stated that he felt full and thought he could urinate. I then removed the arceo cath gently and slowly and then assisted the pt to a standing position and I gave him a graduate to urinate into. Pt was immediately able to urinate 60 ml of slightly blood tinged urine without blood clots with a fairly good stream.  Pt had slight burning with urination and he tolerated it well. I explained to pt that once he gets home he should just drink a little more than he typically would but he should not force fluidsPt verbalized understanding.

## 2023-05-30 ENCOUNTER — TELEPHONE (OUTPATIENT)
Dept: SURGERY | Facility: CLINIC | Age: 53
End: 2023-05-30

## 2023-05-30 ENCOUNTER — OFFICE VISIT (OUTPATIENT)
Dept: SURGERY | Facility: CLINIC | Age: 53
End: 2023-05-30

## 2023-05-30 DIAGNOSIS — D49.4 BLADDER TUMOR: Primary | ICD-10-CM

## 2023-05-30 PROCEDURE — 99215 OFFICE O/P EST HI 40 MIN: CPT | Performed by: UROLOGY

## 2023-05-30 NOTE — TELEPHONE ENCOUNTER
Pt may need PA thru his [place] insurance for his BCG treatments X 6 to start on 6/28 and for his cysto on 8/19/23.

## 2023-06-04 ENCOUNTER — HOSPITAL ENCOUNTER (EMERGENCY)
Age: 53
Discharge: HOME | End: 2023-06-05
Attending: EMERGENCY MEDICINE
Payer: COMMERCIAL

## 2023-06-04 VITALS
DIASTOLIC BLOOD PRESSURE: 87 MMHG | TEMPERATURE: 98 F | HEIGHT: 67 IN | WEIGHT: 230 LBS | BODY MASS INDEX: 36.1 KG/M2 | RESPIRATION RATE: 18 BRPM | SYSTOLIC BLOOD PRESSURE: 131 MMHG | OXYGEN SATURATION: 96 % | HEART RATE: 82 BPM

## 2023-06-04 DIAGNOSIS — H60.12 CELLULITIS OF LEFT PINNA: Primary | ICD-10-CM

## 2023-06-04 PROCEDURE — 99284 EMERGENCY DEPT VISIT MOD MDM: CPT

## 2023-06-04 PROCEDURE — 99283 EMERGENCY DEPT VISIT LOW MDM: CPT

## 2023-06-05 RX ORDER — AMOXICILLIN AND CLAVULANATE POTASSIUM 875; 125 MG/1; MG/1
1 TABLET, FILM COATED ORAL ONCE
Status: COMPLETED | OUTPATIENT
Start: 2023-06-05 | End: 2023-06-05

## 2023-06-05 RX ORDER — AMOXICILLIN AND CLAVULANATE POTASSIUM 875; 125 MG/1; MG/1
1 TABLET, FILM COATED ORAL 2 TIMES DAILY
Qty: 20 TABLET | Refills: 0 | Status: SHIPPED | OUTPATIENT
Start: 2023-06-05 | End: 2023-06-08

## 2023-06-05 RX ORDER — CHLORHEXIDINE GLUCONATE 213 G/1000ML
30 SOLUTION TOPICAL
Qty: 946 ML | Refills: 0 | Status: SHIPPED | OUTPATIENT
Start: 2023-06-05

## 2023-06-05 NOTE — TELEPHONE ENCOUNTER
Checked on Availity and patient does not need a PA for his BCG treatment. Also checked on Cysto PA, pt does not require PA for that as he has [employer] [place] PPO. Confirmation for BCG sent to scanning.

## 2023-06-06 ENCOUNTER — HOSPITAL ENCOUNTER (EMERGENCY)
Facility: HOSPITAL | Age: 53
Discharge: LEFT BEFORE DISCHARGE | End: 2023-06-06
Payer: COMMERCIAL

## 2023-06-06 ENCOUNTER — HOSPITAL ENCOUNTER (INPATIENT)
Facility: HOSPITAL | Age: 53
LOS: 1 days | Discharge: HOME | End: 2023-06-08
Attending: EMERGENCY MEDICINE | Admitting: INTERNAL MEDICINE
Payer: COMMERCIAL

## 2023-06-06 DIAGNOSIS — H60.12 CELLULITIS OF LEFT EXTERNAL EAR: Primary | ICD-10-CM

## 2023-06-06 LAB
BASOPHILS # BLD AUTO: 0.05 X10(3) UL (ref 0–0.2)
BASOPHILS NFR BLD AUTO: 0.5 %
DEPRECATED RDW RBC AUTO: 43.7 FL (ref 35.1–46.3)
EOSINOPHIL # BLD AUTO: 0.18 X10(3) UL (ref 0–0.7)
EOSINOPHIL NFR BLD AUTO: 1.9 %
ERYTHROCYTE [DISTWIDTH] IN BLOOD BY AUTOMATED COUNT: 13.3 % (ref 11–15)
HCT VFR BLD AUTO: 44.3 %
HGB BLD-MCNC: 15 G/DL
IMM GRANULOCYTES # BLD AUTO: 0.02 X10(3) UL (ref 0–1)
IMM GRANULOCYTES NFR BLD: 0.2 %
LYMPHOCYTES # BLD AUTO: 2.83 X10(3) UL (ref 1–4)
LYMPHOCYTES NFR BLD AUTO: 30.3 %
MCH RBC QN AUTO: 30.3 PG (ref 26–34)
MCHC RBC AUTO-ENTMCNC: 33.9 G/DL (ref 31–37)
MCV RBC AUTO: 89.5 FL
MONOCYTES # BLD AUTO: 0.88 X10(3) UL (ref 0.1–1)
MONOCYTES NFR BLD AUTO: 9.4 %
NEUTROPHILS # BLD AUTO: 5.39 X10 (3) UL (ref 1.5–7.7)
NEUTROPHILS # BLD AUTO: 5.39 X10(3) UL (ref 1.5–7.7)
NEUTROPHILS NFR BLD AUTO: 57.7 %
PLATELET # BLD AUTO: 304 10(3)UL (ref 150–450)
RBC # BLD AUTO: 4.95 X10(6)UL
WBC # BLD AUTO: 9.4 X10(3) UL (ref 4–11)

## 2023-06-06 RX ORDER — MORPHINE SULFATE 4 MG/ML
4 INJECTION, SOLUTION INTRAMUSCULAR; INTRAVENOUS ONCE
Status: COMPLETED | OUTPATIENT
Start: 2023-06-06 | End: 2023-06-06

## 2023-06-07 ENCOUNTER — APPOINTMENT (OUTPATIENT)
Dept: CT IMAGING | Facility: HOSPITAL | Age: 53
End: 2023-06-07
Attending: EMERGENCY MEDICINE
Payer: COMMERCIAL

## 2023-06-07 PROBLEM — H60.20: Status: ACTIVE | Noted: 2023-06-07

## 2023-06-07 LAB
ANION GAP SERPL CALC-SCNC: 1 MMOL/L (ref 0–18)
BILIRUB UR QL: NEGATIVE
BUN BLD-MCNC: 20 MG/DL (ref 7–18)
BUN/CREAT SERPL: 23.8 (ref 10–20)
CALCIUM BLD-MCNC: 9.7 MG/DL (ref 8.5–10.1)
CHLORIDE SERPL-SCNC: 111 MMOL/L (ref 98–112)
CHOLEST SERPL-MCNC: 146 MG/DL (ref ?–200)
CLARITY UR: CLEAR
CO2 SERPL-SCNC: 27 MMOL/L (ref 21–32)
CREAT BLD-MCNC: 0.84 MG/DL
CRP SERPL-MCNC: 0.69 MG/DL (ref ?–0.3)
ERYTHROCYTE [SEDIMENTATION RATE] IN BLOOD: 30 MM/HR
EST. AVERAGE GLUCOSE BLD GHB EST-MCNC: 137 MG/DL (ref 68–126)
GFR SERPLBLD BASED ON 1.73 SQ M-ARVRAT: 104 ML/MIN/1.73M2 (ref 60–?)
GLUCOSE BLD-MCNC: 117 MG/DL (ref 70–99)
GLUCOSE BLDC GLUCOMTR-MCNC: 263 MG/DL (ref 70–99)
GLUCOSE UR-MCNC: NORMAL MG/DL
HBA1C MFR BLD: 6.4 % (ref ?–5.7)
HDLC SERPL-MCNC: 34 MG/DL (ref 40–59)
KETONES UR-MCNC: NEGATIVE MG/DL
LACTATE SERPL-SCNC: 1 MMOL/L (ref 0.4–2)
LDLC SERPL CALC-MCNC: 88 MG/DL (ref ?–100)
LEUKOCYTE ESTERASE UR QL STRIP.AUTO: 75
NITRITE UR QL STRIP.AUTO: NEGATIVE
NONHDLC SERPL-MCNC: 112 MG/DL (ref ?–130)
OSMOLALITY SERPL CALC.SUM OF ELEC: 292 MOSM/KG (ref 275–295)
PH UR: 5.5 [PH] (ref 5–8)
POTASSIUM SERPL-SCNC: 3.7 MMOL/L (ref 3.5–5.1)
PROT UR-MCNC: 20 MG/DL
RBC #/AREA URNS AUTO: >10 /HPF
SODIUM SERPL-SCNC: 139 MMOL/L (ref 136–145)
SP GR UR STRIP: >1.03 (ref 1–1.03)
TRIGL SERPL-MCNC: 135 MG/DL (ref 30–149)
UROBILINOGEN UR STRIP-ACNC: NORMAL
VLDLC SERPL CALC-MCNC: 22 MG/DL (ref 0–30)

## 2023-06-07 PROCEDURE — 70481 CT ORBIT/EAR/FOSSA W/DYE: CPT | Performed by: EMERGENCY MEDICINE

## 2023-06-07 PROCEDURE — 99222 1ST HOSP IP/OBS MODERATE 55: CPT | Performed by: OTOLARYNGOLOGY

## 2023-06-07 RX ORDER — DEXTROSE MONOHYDRATE 25 G/50ML
50 INJECTION, SOLUTION INTRAVENOUS
Status: DISCONTINUED | OUTPATIENT
Start: 2023-06-07 | End: 2023-06-08

## 2023-06-07 RX ORDER — LISINOPRIL 20 MG/1
20 TABLET ORAL DAILY
Status: DISCONTINUED | OUTPATIENT
Start: 2023-06-07 | End: 2023-06-07

## 2023-06-07 RX ORDER — HYDROCODONE BITARTRATE AND ACETAMINOPHEN 5; 325 MG/1; MG/1
1 TABLET ORAL EVERY 6 HOURS PRN
Status: DISCONTINUED | OUTPATIENT
Start: 2023-06-07 | End: 2023-06-08

## 2023-06-07 RX ORDER — HEPARIN SODIUM 5000 [USP'U]/ML
5000 INJECTION, SOLUTION INTRAVENOUS; SUBCUTANEOUS EVERY 8 HOURS SCHEDULED
Status: DISCONTINUED | OUTPATIENT
Start: 2023-06-07 | End: 2023-06-08

## 2023-06-07 RX ORDER — PANTOPRAZOLE SODIUM 40 MG/1
40 TABLET, DELAYED RELEASE ORAL
Status: DISCONTINUED | OUTPATIENT
Start: 2023-06-07 | End: 2023-06-08

## 2023-06-07 RX ORDER — RUFINAMIDE 40 MG/ML
1 SUSPENSION ORAL DAILY
Status: DISCONTINUED | OUTPATIENT
Start: 2023-06-07 | End: 2023-06-08

## 2023-06-07 RX ORDER — HYDROCODONE BITARTRATE AND ACETAMINOPHEN 5; 325 MG/1; MG/1
2 TABLET ORAL EVERY 6 HOURS PRN
Status: DISCONTINUED | OUTPATIENT
Start: 2023-06-07 | End: 2023-06-08

## 2023-06-07 RX ORDER — LISINOPRIL 20 MG/1
20 TABLET ORAL DAILY
Status: DISCONTINUED | OUTPATIENT
Start: 2023-06-07 | End: 2023-06-08

## 2023-06-07 RX ORDER — NICOTINE POLACRILEX 4 MG
30 LOZENGE BUCCAL
Status: DISCONTINUED | OUTPATIENT
Start: 2023-06-07 | End: 2023-06-08

## 2023-06-07 RX ORDER — NICOTINE POLACRILEX 4 MG
15 LOZENGE BUCCAL
Status: DISCONTINUED | OUTPATIENT
Start: 2023-06-07 | End: 2023-06-08

## 2023-06-07 RX ORDER — DEXAMETHASONE SODIUM PHOSPHATE 4 MG/ML
8 VIAL (ML) INJECTION EVERY 8 HOURS
Status: COMPLETED | OUTPATIENT
Start: 2023-06-07 | End: 2023-06-08

## 2023-06-07 RX ORDER — MORPHINE SULFATE 4 MG/ML
4 INJECTION, SOLUTION INTRAMUSCULAR; INTRAVENOUS ONCE
Status: COMPLETED | OUTPATIENT
Start: 2023-06-07 | End: 2023-06-07

## 2023-06-07 NOTE — PLAN OF CARE
Ear drainage cultured, started on Decadron per ENT to get 3 doses. On IV Zosyn. Call light within reach and bed in low position.   Problem: Patient Centered Care  Goal: Patient preferences are identified and integrated in the patient's plan of care  Description: Interventions:  - What would you like us to know as we care for you?  - Provide timely, complete, and accurate information to patient/family  - Incorporate patient and family knowledge, values, beliefs, and cultural backgrounds into the planning and delivery of care  - Encourage patient/family to participate in care and decision-making at the level they choose  - Honor patient and family perspectives and choices  Outcome: Progressing     Problem: Patient/Family Goals  Goal: Patient/Family Long Term Goal  Description: Patient's Long Term Goal:     Interventions:  -   - See additional Care Plan goals for specific interventions  Outcome: Progressing  Goal: Patient/Family Short Term Goal  Description: Patient's Short Term Goal:     Interventions:   -   - See additional Care Plan goals for specific interventions  Outcome: Progressing     Problem: SKIN/TISSUE INTEGRITY - ADULT  Goal: Incision(s), wounds(s) or drain site(s) healing without S/S of infection  Description: INTERVENTIONS:  - Assess and document risk factors for pressure ulcer development  - Assess and document skin integrity  - Assess and document dressing/incision, wound bed, drain sites and surrounding tissue  - Implement wound care per orders  - Initiate isolation precautions as appropriate  - Initiate Pressure Ulcer prevention bundle as indicated  Outcome: Progressing     Problem: PAIN - ADULT  Goal: Verbalizes/displays adequate comfort level or patient's stated pain goal  Description: INTERVENTIONS:  - Encourage pt to monitor pain and request assistance  - Assess pain using appropriate pain scale  - Administer analgesics based on type and severity of pain and evaluate response  - Implement non-pharmacological measures as appropriate and evaluate response  - Consider cultural and social influences on pain and pain management  - Manage/alleviate anxiety  - Utilize distraction and/or relaxation techniques  - Monitor for opioid side effects  - Notify MD/LIP if interventions unsuccessful or patient reports new pain  - Anticipate increased pain with activity and pre-medicate as appropriate  Outcome: Progressing

## 2023-06-07 NOTE — ED QUICK NOTES
Orders for admission, patient is aware of plan and ready to go upstairs. Any questions, please call ED RN Essence Perez at extension [phone].      Patient Covid vaccination status: Unvaccinated     COVID Test Ordered in ED: None    COVID Suspicion at Admission: N/A    Running Infusions:  None    Mental Status/LOC at time of transport: A&Ox4    Other pertinent information: ambulatory  CIWA score: N/A   NIH score:  N/A

## 2023-06-07 NOTE — ED INITIAL ASSESSMENT (HPI)
Pt to ED with c/o increased swelling to left ear. Pt recently Dx with cellulitis to left ear and was prescribed Augmentin. Pt has been taking the Abx for 2 days.

## 2023-06-07 NOTE — PLAN OF CARE
Patient admitted for infection in ear, patient had an I?D done in ed, started on zosyn, norco for pain,  ent consult, id consult, ua  Problem: SKIN/TISSUE INTEGRITY - ADULT  Goal: Incision(s), wounds(s) or drain site(s) healing without S/S of infection  Description: INTERVENTIONS:  - Assess and document risk factors for pressure ulcer development  - Assess and document skin integrity  - Assess and document dressing/incision, wound bed, drain sites and surrounding tissue  - Implement wound care per orders  - Initiate isolation precautions as appropriate  - Initiate Pressure Ulcer prevention bundle as indicated  6/7/2023 0448 by Rupert Tomas RN  Outcome: Progressing  6/7/2023 0447 by Rupert Tomas RN  Outcome: Progressing     Problem: PAIN - ADULT  Goal: Verbalizes/displays adequate comfort level or patient's stated pain goal  Description: INTERVENTIONS:  - Encourage pt to monitor pain and request assistance  - Assess pain using appropriate pain scale  - Administer analgesics based on type and severity of pain and evaluate response  - Implement non-pharmacological measures as appropriate and evaluate response  - Consider cultural and social influences on pain and pain management  - Manage/alleviate anxiety  - Utilize distraction and/or relaxation techniques  - Monitor for opioid side effects  - Notify MD/LIP if interventions unsuccessful or patient reports new pain  - Anticipate increased pain with activity and pre-medicate as appropriate  6/7/2023 0448 by Rupert Tomas RN  Outcome: Progressing  6/7/2023 0447 by Rupert Tomas RN  Outcome: Progressing

## 2023-06-07 NOTE — ED NOTES
Orders for admission, patient is aware of plan and ready to go upstairs. Any questions, please call ED RN Milton May  at extension [phone].        Titratable drug(s) infusing:zosyn  Rate:200ml/hr    LOC at time of transport:x4    Other pertinent information:    ambulatory    CIWA score=  NIH score=

## 2023-06-08 VITALS
SYSTOLIC BLOOD PRESSURE: 124 MMHG | OXYGEN SATURATION: 94 % | DIASTOLIC BLOOD PRESSURE: 73 MMHG | RESPIRATION RATE: 20 BRPM | WEIGHT: 233.38 LBS | BODY MASS INDEX: 36.63 KG/M2 | TEMPERATURE: 98 F | HEART RATE: 93 BPM | HEIGHT: 67 IN

## 2023-06-08 PROBLEM — E13.9 DIABETES 1.5, MANAGED AS TYPE 2 (HCC): Status: ACTIVE | Noted: 2023-06-07

## 2023-06-08 LAB
GLUCOSE BLDC GLUCOMTR-MCNC: 193 MG/DL (ref 70–99)
GLUCOSE BLDC GLUCOMTR-MCNC: 219 MG/DL (ref 70–99)
GLUCOSE BLDC GLUCOMTR-MCNC: 256 MG/DL (ref 70–99)

## 2023-06-08 PROCEDURE — 99232 SBSQ HOSP IP/OBS MODERATE 35: CPT | Performed by: OTOLARYNGOLOGY

## 2023-06-08 RX ORDER — SULFAMETHOXAZOLE AND TRIMETHOPRIM 800; 160 MG/1; MG/1
1 TABLET ORAL 2 TIMES DAILY
Qty: 20 TABLET | Refills: 0 | Status: SHIPPED | OUTPATIENT
Start: 2023-06-08 | End: 2023-06-18

## 2023-06-08 RX ORDER — HYDROCODONE BITARTRATE AND ACETAMINOPHEN 5; 325 MG/1; MG/1
1 TABLET ORAL EVERY 6 HOURS PRN
Qty: 30 TABLET | Refills: 0 | Status: SHIPPED | OUTPATIENT
Start: 2023-06-08

## 2023-06-08 RX ORDER — AMOXICILLIN AND CLAVULANATE POTASSIUM 875; 125 MG/1; MG/1
1 TABLET, FILM COATED ORAL 2 TIMES DAILY
Qty: 20 TABLET | Refills: 0 | Status: SHIPPED | OUTPATIENT
Start: 2023-06-08 | End: 2023-06-18

## 2023-06-08 NOTE — PLAN OF CARE
Rec'd order for discharge, saline lock removed. Patient given diabetes education handout and instructed to start checking his blood sugars with the glucometer he has at home. Patient given discharge instructions including when to take next doses, pt given information to make follow up MD appointments. Patient also to  new prescriptions at his pharmacy. All questions answered. Patient discharged in good condition.

## 2023-06-08 NOTE — H&P
[facility]    PATIENT'S NAME: Alirio Christine   ATTENDING PHYSICIAN: Tod Garcia MD   PATIENT ACCOUNT#:   [de-identified]    LOCATION:  [address] [place] [address] RECORD #:   [id number]       YOB: 1970  ADMISSION DATE:       06/06/2023    HISTORY AND PHYSICAL EXAMINATION    DATE OF SERVICE:  06/07/2023    CHIEF COMPLAINT:  Left-sided ear infection for the past 2 to 3 days and otitis externa. HISTORY OF PRESENT ILLNESS:  The patient is a 58-year-old gentleman who presents with pain and swelling of the left ear associated with minimal drainage. Patient originally had reported a pimple which had progressively increase in size and recently was treated with Augmentin for left ear cellulitis. Patient at the time of my evaluation reported significant improvement of pain since starting IV antibiotics. Patient denies any facial drooping, denies any history of swimming. PAST MEDICAL HISTORY:  Relevant for history of obesity, history of hypertension, history of possible diabetes but has been poorly following up. MEDICATIONS:  Patient's home medicines include Augmentin 875/125 one tablet orally twice daily, chlorhexidine 30 mL as needed, BCG live 50 mg intravesical, Bactrim DS 1 tablet orally twice daily, lisinopril 20 mg orally daily, multivitamin Centrum 1 tablet orally daily. ALLERGIES:  The patient has no known drug allergies. SOCIAL HISTORY:  No alcohol. No drugs. No tobacco.    REVIEW OF SYSTEMS:  A 13-point review of systems was performed and was negative except as per HPI. Patient has no history of spinning. PHYSICAL EXAMINATION:    HEENT:  Left ear is extremely swollen. No drainage is noted. Anterior to tragus swelling of the tissue is noted. No parotid swelling is noted. Tongue is midline. No stridor is noted. LUNGS:  Clear to auscultation. HEART:  S1, S2 heard. No S3. No S4. No murmur. No rub. No gallop. ABDOMEN:  Soft, nontender, nondistended.   No hepatosplenomegaly. EXTREMITIES:  No edema. NEUROLOGIC:  Alert and oriented x3. No deficit. LABORATORY DATA:  Pertinent lab results:  WBC 4.95, hemoglobin 15, hematocrit 44.3, MCV 59.5, platelet count of 105. Sodium 139, potassium 3.7, chloride 111, bicarbonate 27, BUN is 20, creatinine 0.84. CT of the head shows preauricular erythema, tender to palpation with purulent discharge noted. ASSESSMENT AND PLAN:    1. Left otitis externa. Patient ruled in for diabetes. His hemoglobin A1c was 6.4. Patient is receiving IV antibiotics and Zosyn. Further management per Infectious Disease. 2.   Hypertension. Patient is on lisinopril and have reordered lisinopril. 3.   DVT prophylaxis. Patient will be on subcutaneous heparin every 8 hours. 4.   GI prophylaxis. Patient is on Protonix as needed. 5.   Diabetes. Patient will be on sliding scale insulin. Total time spent seeing the patient was 70 minutes.     Dictated By Hany Arshad MD  d: 06/07/2023 22:44:35  t: 06/08/2023 00:06:26  Job [id number]  /

## 2023-06-08 NOTE — PLAN OF CARE
Iv antibiotics, pain is controlled,  iv decodron started,  patients blood sugars are elavated sliding scale coverage ordered.         Problem: SKIN/TISSUE INTEGRITY - ADULT  Goal: Incision(s), wounds(s) or drain site(s) healing without S/S of infection  Description: INTERVENTIONS:  - Assess and document risk factors for pressure ulcer development  - Assess and document skin integrity  - Assess and document dressing/incision, wound bed, drain sites and surrounding tissue  - Implement wound care per orders  - Initiate isolation precautions as appropriate  - Initiate Pressure Ulcer prevention bundle as indicated  Outcome: Progressing     Problem: PAIN - ADULT  Goal: Verbalizes/displays adequate comfort level or patient's stated pain goal  Description: INTERVENTIONS:  - Encourage pt to monitor pain and request assistance  - Assess pain using appropriate pain scale  - Administer analgesics based on type and severity of pain and evaluate response  - Implement non-pharmacological measures as appropriate and evaluate response  - Consider cultural and social influences on pain and pain management  - Manage/alleviate anxiety  - Utilize distraction and/or relaxation techniques  - Monitor for opioid side effects  - Notify MD/LIP if interventions unsuccessful or patient reports new pain  - Anticipate increased pain with activity and pre-medicate as appropriate  Outcome: Progressing

## 2023-06-09 ENCOUNTER — PATIENT OUTREACH (OUTPATIENT)
Dept: CASE MANAGEMENT | Age: 53
End: 2023-06-09

## 2023-06-09 NOTE — PROGRESS NOTES
Received VM from pts wife Tish Gennaamos requesting assistance scheduling apt w/ID    [id number] [facility]  [address] [place] [address]  [phone]  F/u in 2 weeks    Please contact Tish Donohue and assist

## 2023-06-09 NOTE — PROGRESS NOTES
Responding to patient's voicemail.    (dc 6/8)    [id number] [facility]  [address] [employer] [address] [place] [address]  [phone]  No availability, office to call patient's wife and schedule. Confirmed with patient's wife. Closing encounter.

## 2023-06-09 NOTE — PROGRESS NOTES
Responding to patient's voicemail. Office refusing appointment. Calling office back on Monday morning. Leaving encounter open.

## 2023-06-10 ENCOUNTER — HOSPITAL ENCOUNTER (EMERGENCY)
Facility: HOSPITAL | Age: 53
Discharge: HOME | End: 2023-06-10
Attending: EMERGENCY MEDICINE
Payer: COMMERCIAL

## 2023-06-10 VITALS
SYSTOLIC BLOOD PRESSURE: 124 MMHG | WEIGHT: 230 LBS | RESPIRATION RATE: 18 BRPM | HEIGHT: 67 IN | DIASTOLIC BLOOD PRESSURE: 80 MMHG | OXYGEN SATURATION: 96 % | BODY MASS INDEX: 36.1 KG/M2 | HEART RATE: 70 BPM | TEMPERATURE: 98 F

## 2023-06-10 DIAGNOSIS — H60.02 ABSCESS OF LEFT EXTERNAL EAR: Primary | ICD-10-CM

## 2023-06-10 LAB
ANION GAP SERPL CALC-SCNC: 9 MMOL/L (ref 0–18)
BASOPHILS # BLD AUTO: 0.03 X10(3) UL (ref 0–0.2)
BASOPHILS NFR BLD AUTO: 0.3 %
BUN BLD-MCNC: 27 MG/DL (ref 7–18)
BUN/CREAT SERPL: 29 (ref 10–20)
CALCIUM BLD-MCNC: 9.5 MG/DL (ref 8.5–10.1)
CHLORIDE SERPL-SCNC: 108 MMOL/L (ref 98–112)
CO2 SERPL-SCNC: 26 MMOL/L (ref 21–32)
CREAT BLD-MCNC: 0.93 MG/DL
DEPRECATED RDW RBC AUTO: 45.5 FL (ref 35.1–46.3)
EOSINOPHIL # BLD AUTO: 0.12 X10(3) UL (ref 0–0.7)
EOSINOPHIL NFR BLD AUTO: 1.2 %
ERYTHROCYTE [DISTWIDTH] IN BLOOD BY AUTOMATED COUNT: 13.7 % (ref 11–15)
GFR SERPLBLD BASED ON 1.73 SQ M-ARVRAT: 98 ML/MIN/1.73M2 (ref 60–?)
GLUCOSE BLD-MCNC: 140 MG/DL (ref 70–99)
HCT VFR BLD AUTO: 43 %
HGB BLD-MCNC: 14.5 G/DL
IMM GRANULOCYTES # BLD AUTO: 0.04 X10(3) UL (ref 0–1)
IMM GRANULOCYTES NFR BLD: 0.4 %
LYMPHOCYTES # BLD AUTO: 3.64 X10(3) UL (ref 1–4)
LYMPHOCYTES NFR BLD AUTO: 35.4 %
MCH RBC QN AUTO: 30.5 PG (ref 26–34)
MCHC RBC AUTO-ENTMCNC: 33.7 G/DL (ref 31–37)
MCV RBC AUTO: 90.3 FL
MONOCYTES # BLD AUTO: 0.94 X10(3) UL (ref 0.1–1)
MONOCYTES NFR BLD AUTO: 9.1 %
NEUTROPHILS # BLD AUTO: 5.51 X10 (3) UL (ref 1.5–7.7)
NEUTROPHILS # BLD AUTO: 5.51 X10(3) UL (ref 1.5–7.7)
NEUTROPHILS NFR BLD AUTO: 53.6 %
OSMOLALITY SERPL CALC.SUM OF ELEC: 303 MOSM/KG (ref 275–295)
PLATELET # BLD AUTO: 327 10(3)UL (ref 150–450)
POTASSIUM SERPL-SCNC: 4.1 MMOL/L (ref 3.5–5.1)
RBC # BLD AUTO: 4.76 X10(6)UL
SODIUM SERPL-SCNC: 143 MMOL/L (ref 136–145)
WBC # BLD AUTO: 10.3 X10(3) UL (ref 4–11)

## 2023-06-10 PROCEDURE — 80048 BASIC METABOLIC PNL TOTAL CA: CPT | Performed by: EMERGENCY MEDICINE

## 2023-06-10 PROCEDURE — 99284 EMERGENCY DEPT VISIT MOD MDM: CPT

## 2023-06-10 PROCEDURE — 85025 COMPLETE CBC W/AUTO DIFF WBC: CPT | Performed by: EMERGENCY MEDICINE

## 2023-06-10 PROCEDURE — 69000 DRG XTRNL EAR ABSC/HEM SMPL: CPT

## 2023-06-10 PROCEDURE — 36415 COLL VENOUS BLD VENIPUNCTURE: CPT

## 2023-06-10 PROCEDURE — 99283 EMERGENCY DEPT VISIT LOW MDM: CPT

## 2023-06-10 RX ORDER — LIDOCAINE HYDROCHLORIDE 10 MG/ML
20 INJECTION, SOLUTION EPIDURAL; INFILTRATION; INTRACAUDAL; PERINEURAL ONCE
Status: COMPLETED | OUTPATIENT
Start: 2023-06-10 | End: 2023-06-10

## 2023-06-10 RX ORDER — LIDOCAINE AND PRILOCAINE 25; 25 MG/G; MG/G
CREAM TOPICAL ONCE
Status: COMPLETED | OUTPATIENT
Start: 2023-06-10 | End: 2023-06-10

## 2023-06-11 NOTE — ED INITIAL ASSESSMENT (HPI)
Pt c/o worsening infection, pain/swelling to the left ear, was discharged 2 days ago on antibiotics.

## 2023-06-11 NOTE — DISCHARGE INSTRUCTIONS
Continue antibiotics as previously prescribed. See ENT for follow-up as discussed. Take ibuprofen and or Tylenol as needed for discomfort. Return to the ER if you develop worsening symptoms, increased pain or swelling, fever, vomiting, or any emergent concerns.

## 2023-06-14 ENCOUNTER — OFFICE VISIT (OUTPATIENT)
Dept: OTOLARYNGOLOGY | Facility: CLINIC | Age: 53
End: 2023-06-14

## 2023-06-14 DIAGNOSIS — H60.8X3 CHRONIC ECZEMATOUS OTITIS EXTERNA OF BOTH EARS: ICD-10-CM

## 2023-06-14 DIAGNOSIS — L02.01 ABSCESS OF PREAURICULAR SINUS: Primary | ICD-10-CM

## 2023-06-14 DIAGNOSIS — Q18.1 ABSCESS OF PREAURICULAR SINUS: Primary | ICD-10-CM

## 2023-06-14 PROCEDURE — 99243 OFF/OP CNSLTJ NEW/EST LOW 30: CPT | Performed by: STUDENT IN AN ORGANIZED HEALTH CARE EDUCATION/TRAINING PROGRAM

## 2023-06-14 PROCEDURE — 69210 REMOVE IMPACTED EAR WAX UNI: CPT | Performed by: STUDENT IN AN ORGANIZED HEALTH CARE EDUCATION/TRAINING PROGRAM

## 2023-06-14 RX ORDER — FLUOCINOLONE ACETONIDE 0.11 MG/ML
3 OIL AURICULAR (OTIC) 3 TIMES DAILY
Qty: 20 ML | Refills: 0 | Status: SHIPPED | OUTPATIENT
Start: 2023-06-14 | End: 2023-06-21

## 2023-06-14 RX ORDER — MOMETASONE FUROATE 1 MG/G
1 OINTMENT TOPICAL DAILY
Qty: 1 EACH | Refills: 0 | Status: SHIPPED | OUTPATIENT
Start: 2023-06-14

## 2023-06-26 ENCOUNTER — LAB ENCOUNTER (OUTPATIENT)
Dept: LAB | Facility: HOSPITAL | Age: 53
End: 2023-06-26
Attending: UROLOGY
Payer: COMMERCIAL

## 2023-06-26 DIAGNOSIS — D49.4 BLADDER TUMOR: ICD-10-CM

## 2023-06-26 LAB
BILIRUB UR QL: NEGATIVE
CLARITY UR: CLEAR
COLOR UR: YELLOW
GLUCOSE UR-MCNC: 50 MG/DL
KETONES UR-MCNC: NEGATIVE MG/DL
LEUKOCYTE ESTERASE UR QL STRIP.AUTO: 500
NITRITE UR QL STRIP.AUTO: NEGATIVE
PH UR: 5.5 [PH] (ref 5–8)
PROT UR-MCNC: 30 MG/DL
RBC #/AREA URNS AUTO: >10 /HPF
SP GR UR STRIP: 1.03 (ref 1–1.03)
UROBILINOGEN UR STRIP-ACNC: NORMAL
WBC #/AREA URNS AUTO: >50 /HPF

## 2023-06-26 PROCEDURE — 81001 URINALYSIS AUTO W/SCOPE: CPT

## 2023-06-26 PROCEDURE — 87086 URINE CULTURE/COLONY COUNT: CPT

## 2023-06-28 ENCOUNTER — PROCEDURE (OUTPATIENT)
Dept: SURGERY | Facility: CLINIC | Age: 53
End: 2023-06-28

## 2023-06-28 DIAGNOSIS — C67.9 MALIGNANT NEOPLASM OF URINARY BLADDER, UNSPECIFIED SITE (HCC): Primary | ICD-10-CM

## 2023-06-28 PROCEDURE — 51720 TREATMENT OF BLADDER LESION: CPT | Performed by: NURSE PRACTITIONER

## 2023-07-03 ENCOUNTER — LAB ENCOUNTER (OUTPATIENT)
Dept: LAB | Facility: HOSPITAL | Age: 53
End: 2023-07-03
Attending: UROLOGY
Payer: COMMERCIAL

## 2023-07-03 DIAGNOSIS — D49.4 BLADDER TUMOR: ICD-10-CM

## 2023-07-03 LAB
BILIRUB UR QL: NEGATIVE
COLOR UR: YELLOW
GLUCOSE UR-MCNC: NORMAL MG/DL
KETONES UR-MCNC: NEGATIVE MG/DL
LEUKOCYTE ESTERASE UR QL STRIP.AUTO: 500
NITRITE UR QL STRIP.AUTO: NEGATIVE
PH UR: 5.5 [PH] (ref 5–8)
PROT UR-MCNC: 20 MG/DL
RBC #/AREA URNS AUTO: >10 /HPF
SP GR UR STRIP: 1.02 (ref 1–1.03)
UROBILINOGEN UR STRIP-ACNC: NORMAL
WBC #/AREA URNS AUTO: >50 /HPF

## 2023-07-03 PROCEDURE — 87086 URINE CULTURE/COLONY COUNT: CPT

## 2023-07-03 PROCEDURE — 81001 URINALYSIS AUTO W/SCOPE: CPT

## 2023-07-05 ENCOUNTER — PROCEDURE (OUTPATIENT)
Dept: SURGERY | Facility: CLINIC | Age: 53
End: 2023-07-05

## 2023-07-05 VITALS — HEART RATE: 76 BPM | DIASTOLIC BLOOD PRESSURE: 87 MMHG | SYSTOLIC BLOOD PRESSURE: 122 MMHG

## 2023-07-05 DIAGNOSIS — C67.9 MALIGNANT NEOPLASM OF URINARY BLADDER, UNSPECIFIED SITE (HCC): Primary | ICD-10-CM

## 2023-07-05 DIAGNOSIS — R39.9 LOWER URINARY TRACT SYMPTOMS: ICD-10-CM

## 2023-07-05 PROCEDURE — 3074F SYST BP LT 130 MM HG: CPT | Performed by: UROLOGY

## 2023-07-05 PROCEDURE — 51720 TREATMENT OF BLADDER LESION: CPT | Performed by: UROLOGY

## 2023-07-05 PROCEDURE — 3079F DIAST BP 80-89 MM HG: CPT | Performed by: UROLOGY

## 2023-07-05 NOTE — PROGRESS NOTES
BCG INSTILLATION    PRE-OP DIAGNOSIS: 2023    POST-OP DIAGNOSIS: same    PROCEDURE: BCG instillation    SURGEON: Sonali Rivers MD    ASSISTANT: none     EBL: minimal    FINDINGS:   Ucx negative    INDICATIONS:  This is the instillation . Ucx negative. Patient denies any symptoms to suggest a UTI. I explained that there is no obvious contraindication to performing his BCG instillation today. Consent has been signed. He confirms he has bleach available at home. PROCEDURE: Patient was brought to the procedure suite and a timeout was performed identifying the patient,  and procedure being performed. 14 fr catheter was placed by RN using sterile technique. Return of clear yellow urine. BCG full strengthwas gently instilled into the bladder, patient tolerated well. Catheter was them removed. Supplies were disposed of using standard BCG protocol. There were no complications after this procedure and the patient tolerated the procedure without issue. IMPRESSION: s/p BCG instillation # 2/ - return to clinic for BCG treatment instillation # 3/.     PLAN:    RTC for remaining BCG treatment

## 2023-07-05 NOTE — PROGRESS NOTES
-Pt presents for BCG # 2 of 6 bladder installation; identity verified with name & ;  procedure explained to pt and pt agreed to proceed. -Pt positions self on exam table; draped for privacy to lower pants and underwear to his ankles; penis tip prepped with betadine; instill to urethra 2% lido-gel 2-3min for pain management; insert 14Fr coude straight catheter; Timeout/ BCG instillation/ catheter removal done by MD MATTHEW. Pt tolerated well; redressed self. I reminded pt to f/u with next BCG on Wednesday and labs on Monday, prior to BCG tx. . Pt verbalized understanding and compliance. I accompanied pt to exit. -Encounter complete.

## 2023-07-10 ENCOUNTER — LAB ENCOUNTER (OUTPATIENT)
Dept: LAB | Facility: HOSPITAL | Age: 53
End: 2023-07-10
Attending: UROLOGY
Payer: COMMERCIAL

## 2023-07-10 DIAGNOSIS — D49.4 BLADDER TUMOR: ICD-10-CM

## 2023-07-10 LAB
BILIRUB UR QL: NEGATIVE
CLARITY UR: CLEAR
COLOR UR: YELLOW
GLUCOSE UR-MCNC: NORMAL MG/DL
KETONES UR-MCNC: NEGATIVE MG/DL
LEUKOCYTE ESTERASE UR QL STRIP.AUTO: 500
NITRITE UR QL STRIP.AUTO: NEGATIVE
PH UR: 5 [PH] (ref 5–8)
RBC #/AREA URNS AUTO: >10 /HPF
SP GR UR STRIP: 1.02 (ref 1–1.03)
UROBILINOGEN UR STRIP-ACNC: NORMAL

## 2023-07-10 PROCEDURE — 87086 URINE CULTURE/COLONY COUNT: CPT

## 2023-07-10 PROCEDURE — 81001 URINALYSIS AUTO W/SCOPE: CPT

## 2023-07-12 ENCOUNTER — PROCEDURE (OUTPATIENT)
Dept: SURGERY | Facility: CLINIC | Age: 53
End: 2023-07-12

## 2023-07-12 VITALS — HEART RATE: 79 BPM | SYSTOLIC BLOOD PRESSURE: 126 MMHG | DIASTOLIC BLOOD PRESSURE: 86 MMHG

## 2023-07-12 DIAGNOSIS — C67.9 MALIGNANT NEOPLASM OF URINARY BLADDER, UNSPECIFIED SITE (HCC): Primary | ICD-10-CM

## 2023-07-12 PROCEDURE — 51720 TREATMENT OF BLADDER LESION: CPT | Performed by: NURSE PRACTITIONER

## 2023-07-12 PROCEDURE — 3079F DIAST BP 80-89 MM HG: CPT | Performed by: NURSE PRACTITIONER

## 2023-07-12 PROCEDURE — 3074F SYST BP LT 130 MM HG: CPT | Performed by: NURSE PRACTITIONER

## 2023-07-12 NOTE — PROGRESS NOTES
Subjective:   Patient ID: Timoteo Jernigan is a 46year old male. HPI    Patient is a 46year old male who presents to the clinic for a BCG instillation. This will be the 3rd of 6 instillations. History/Other:   Review of Systems  Pertinent positives and negative per TEO. A 10 point ROS was performed and is otherwise negative. Current Outpatient Medications   Medication Sig Dispense Refill    mometasone 0.1 % External Ointment Apply 1 Application topically daily. 1 each 0    HYDROcodone-acetaminophen 5-325 MG Oral Tab Take 1 tablet by mouth every 6 (six) hours as needed. 30 tablet 0    metFORMIN 500 MG Oral Tab Take 1 tablet (500 mg total) by mouth 2 (two) times daily with meals. 60 tablet 0    chlorhexidine (HIBICLENS) 4 % External Liquid Apply 30 mL topically daily as needed. 946 mL 0    lisinopril 20 MG Oral Tab Take 1 tablet (20 mg total) by mouth daily. Multiple Vitamins-Minerals (CENTRUM MEN OR) Take 1 tablet by mouth daily. Allergies:No Known Allergies    Objective:   Physical Exam  HENT:      Head: Normocephalic. Eyes:      Conjunctiva/sclera: Conjunctivae normal.   Skin:     General: Skin is warm and dry. Neurological:      Mental Status: He is alert and oriented to person, place, and time. Psychiatric:         Mood and Affect: Mood normal.         Behavior: Behavior normal.         Thought Content: Thought content normal.         Judgment: Judgment normal.         Assessment & Plan:   Malignant neoplasm of urinary bladder, unspecified site [facility])  (primary encounter diagnosis)    Patient is a 46year old male who presents to the clinic for a BCG instillation. This is the 3 of 6 instillation. Urine culture done 7/10/23 shows no growth. He denies any symptoms to suggest a UTI. I explained that there is no contraindication to performing his BCG instillation today. Consent has been signed. He confirms he has bleach available at home.   14 fr catheter was placed by RN using sterile technique. Return of clear yellow urine. BCG full strength (50 mg) was gently instilled into the bladder, patient tolerated well. Catheter was them removed. Supplies were disposed of using standard BCG protocol. Follow up in 1 week. Patient was agreeable. No orders of the defined types were placed in this encounter.       Meds This Visit:  Requested Prescriptions      No prescriptions requested or ordered in this encounter       Imaging & Referrals:  None

## 2023-07-12 NOTE — PROGRESS NOTES
- Patient presents for BCG instillation #3 of 6. Identified patient using his full name and . - Positioned patient on the bed and he removed his clothing below the waist.   - I draped the patient with a sterile drape, prepped the area in sterile fashion and donned sterile gloves. I prepped the tip of the penis first with Iodine let this dry and then injected Lidocaine 2% for numbing. Inserted the straight cath coude catheter without problems. - Time out/bladder irrigation/catheter removal performed by SOHAM Byrd. Products used disposed of in a biohazard container.  - Pt cleaned area with wipes and dressed himself.    - Encounter complete

## 2023-07-17 ENCOUNTER — LAB ENCOUNTER (OUTPATIENT)
Dept: LAB | Facility: HOSPITAL | Age: 53
End: 2023-07-17
Attending: UROLOGY
Payer: COMMERCIAL

## 2023-07-17 DIAGNOSIS — D49.4 BLADDER TUMOR: ICD-10-CM

## 2023-07-17 LAB
BILIRUB UR QL: NEGATIVE
CLARITY UR: CLEAR
COLOR UR: YELLOW
GLUCOSE UR-MCNC: NORMAL MG/DL
KETONES UR-MCNC: NEGATIVE MG/DL
LEUKOCYTE ESTERASE UR QL STRIP.AUTO: 250
NITRITE UR QL STRIP.AUTO: NEGATIVE
PH UR: 5.5 [PH] (ref 5–8)
PROT UR-MCNC: 20 MG/DL
RBC #/AREA URNS AUTO: >10 /HPF
SP GR UR STRIP: 1.03 (ref 1–1.03)
UROBILINOGEN UR STRIP-ACNC: NORMAL
WBC #/AREA URNS AUTO: >50 /HPF

## 2023-07-17 PROCEDURE — 81001 URINALYSIS AUTO W/SCOPE: CPT

## 2023-07-17 PROCEDURE — 87086 URINE CULTURE/COLONY COUNT: CPT

## 2023-07-19 ENCOUNTER — PROCEDURE (OUTPATIENT)
Dept: SURGERY | Facility: CLINIC | Age: 53
End: 2023-07-19

## 2023-07-19 VITALS — DIASTOLIC BLOOD PRESSURE: 93 MMHG | SYSTOLIC BLOOD PRESSURE: 144 MMHG | HEART RATE: 91 BPM

## 2023-07-19 DIAGNOSIS — C67.9 MALIGNANT NEOPLASM OF URINARY BLADDER, UNSPECIFIED SITE (HCC): Primary | ICD-10-CM

## 2023-07-19 PROCEDURE — 3080F DIAST BP >= 90 MM HG: CPT | Performed by: NURSE PRACTITIONER

## 2023-07-19 PROCEDURE — 3077F SYST BP >= 140 MM HG: CPT | Performed by: NURSE PRACTITIONER

## 2023-07-19 PROCEDURE — 51720 TREATMENT OF BLADDER LESION: CPT | Performed by: NURSE PRACTITIONER

## 2023-07-19 NOTE — PROGRESS NOTES
-Patient presents for BCG instillation # 4 of 6. Identified patient using his full name and . - Positioned patient on the bed and pt removed his clothing below the waist.   - I draped the patient with a sterile drape, prepped the area in sterile fashion and donned sterile gloves. I prepped the tip of the penis first with Iodine let this dry and then injected Lidocaine 2% for numbing. Inserted the straight cath coude catheter without problems. - Time out/bladder irrigation/catheter removal performed by SOHAM Bernal. Products used disposed of in a biohazard container.  - Pt cleaned area with wipes and dressed himself. I reminded pt to f/u with next BCG next week and labs on Monday prior to BCG. Pt verbalized understanding, escorted pt to exit.    - Encounter complete

## 2023-07-24 ENCOUNTER — LAB ENCOUNTER (OUTPATIENT)
Dept: LAB | Facility: HOSPITAL | Age: 53
End: 2023-07-24
Attending: UROLOGY
Payer: COMMERCIAL

## 2023-07-24 DIAGNOSIS — D49.4 BLADDER TUMOR: ICD-10-CM

## 2023-07-24 LAB
BILIRUB UR QL: NEGATIVE
CLARITY UR: CLEAR
COLOR UR: YELLOW
GLUCOSE UR-MCNC: NORMAL MG/DL
KETONES UR-MCNC: NEGATIVE MG/DL
LEUKOCYTE ESTERASE UR QL STRIP.AUTO: 250
NITRITE UR QL STRIP.AUTO: NEGATIVE
PH UR: 5.5 [PH] (ref 5–8)
PROT UR-MCNC: NEGATIVE MG/DL
RBC #/AREA URNS AUTO: >10 /HPF
SP GR UR STRIP: 1.02 (ref 1–1.03)
UROBILINOGEN UR STRIP-ACNC: NORMAL

## 2023-07-24 PROCEDURE — 87086 URINE CULTURE/COLONY COUNT: CPT

## 2023-07-24 PROCEDURE — 81001 URINALYSIS AUTO W/SCOPE: CPT

## 2023-07-26 ENCOUNTER — PROCEDURE (OUTPATIENT)
Dept: SURGERY | Facility: CLINIC | Age: 53
End: 2023-07-26

## 2023-07-26 DIAGNOSIS — C67.9 MALIGNANT NEOPLASM OF URINARY BLADDER, UNSPECIFIED SITE (HCC): Primary | ICD-10-CM

## 2023-07-26 PROCEDURE — 51720 TREATMENT OF BLADDER LESION: CPT | Performed by: NURSE PRACTITIONER

## 2023-07-26 NOTE — PROGRESS NOTES
Subjective:   Patient ID: Eliud Blas is a 46year old male. HPI    Patient is a 46year old male who presents to the clinic for a BCG instillation. This will be the 5th of 6 instillations. History/Other:   Review of Systems  Pertinent positives and negative per TEO. A 10 point ROS was performed and is otherwise negative. Current Outpatient Medications   Medication Sig Dispense Refill    mometasone 0.1 % External Ointment Apply 1 Application topically daily. 1 each 0    HYDROcodone-acetaminophen 5-325 MG Oral Tab Take 1 tablet by mouth every 6 (six) hours as needed. 30 tablet 0    metFORMIN 500 MG Oral Tab Take 1 tablet (500 mg total) by mouth 2 (two) times daily with meals. 60 tablet 0    chlorhexidine (HIBICLENS) 4 % External Liquid Apply 30 mL topically daily as needed. 946 mL 0    lisinopril 20 MG Oral Tab Take 1 tablet (20 mg total) by mouth daily. Multiple Vitamins-Minerals (CENTRUM MEN OR) Take 1 tablet by mouth daily. Allergies:No Known Allergies    Objective:   Physical Exam  HENT:      Head: Normocephalic. Eyes:      Conjunctiva/sclera: Conjunctivae normal.   Skin:     General: Skin is warm and dry. Neurological:      Mental Status: He is alert and oriented to person, place, and time. Psychiatric:         Mood and Affect: Mood normal.         Behavior: Behavior normal.         Thought Content: Thought content normal.         Judgment: Judgment normal.         Assessment & Plan:   Malignant neoplasm of urinary bladder, unspecified site [facility])  (primary encounter diagnosis)    Patient is a 46year old male who presents to the clinic for a BCG instillation. This is the 5th of 6 instillation. Urine culture done 7/24/23 shows no growth. He denies any symptoms to suggest a UTI. I explained that there is no contraindication to performing his BCG instillation today. Consent has been signed. He confirms he has bleach available at home.   14 fr catheter was placed by RN using sterile technique. Return of clear yellow urine. BCG full strength (50 mg) was gently instilled into the bladder, patient tolerated well. Catheter was them removed. Supplies were disposed of using standard BCG protocol. Follow up in 1 week. Patient was agreeable. No orders of the defined types were placed in this encounter.       Meds This Visit:  Requested Prescriptions      No prescriptions requested or ordered in this encounter       Imaging & Referrals:  None

## 2023-07-31 ENCOUNTER — LAB ENCOUNTER (OUTPATIENT)
Dept: LAB | Facility: HOSPITAL | Age: 53
End: 2023-07-31
Attending: UROLOGY
Payer: COMMERCIAL

## 2023-07-31 DIAGNOSIS — D49.4 BLADDER TUMOR: ICD-10-CM

## 2023-07-31 LAB
BILIRUB UR QL: NEGATIVE
CLARITY UR: CLEAR
COLOR UR: YELLOW
GLUCOSE UR-MCNC: NORMAL MG/DL
HGB UR QL STRIP.AUTO: NEGATIVE
KETONES UR-MCNC: NEGATIVE MG/DL
LEUKOCYTE ESTERASE UR QL STRIP.AUTO: 75
NITRITE UR QL STRIP.AUTO: NEGATIVE
PH UR: 5 [PH] (ref 5–8)
PROT UR-MCNC: NEGATIVE MG/DL
SP GR UR STRIP: 1.02 (ref 1–1.03)
UROBILINOGEN UR STRIP-ACNC: NORMAL

## 2023-07-31 PROCEDURE — 87086 URINE CULTURE/COLONY COUNT: CPT

## 2023-07-31 PROCEDURE — 81001 URINALYSIS AUTO W/SCOPE: CPT

## 2023-08-02 ENCOUNTER — PROCEDURE (OUTPATIENT)
Dept: SURGERY | Facility: CLINIC | Age: 53
End: 2023-08-02

## 2023-08-02 VITALS — HEART RATE: 86 BPM | SYSTOLIC BLOOD PRESSURE: 135 MMHG | DIASTOLIC BLOOD PRESSURE: 85 MMHG

## 2023-08-02 DIAGNOSIS — C67.9 MALIGNANT NEOPLASM OF URINARY BLADDER, UNSPECIFIED SITE (HCC): Primary | ICD-10-CM

## 2023-08-02 PROCEDURE — 3075F SYST BP GE 130 - 139MM HG: CPT | Performed by: NURSE PRACTITIONER

## 2023-08-02 PROCEDURE — 51720 TREATMENT OF BLADDER LESION: CPT | Performed by: NURSE PRACTITIONER

## 2023-08-02 PROCEDURE — 3079F DIAST BP 80-89 MM HG: CPT | Performed by: NURSE PRACTITIONER

## 2023-08-02 NOTE — PROGRESS NOTES
Subjective:   Patient ID: Aye Ojeda is a 46year old male. HPI    Patient is a 46year old male who presents to the clinic for a BCG instillation. This will be the 6th of 6 instillations. History/Other:   Review of Systems  Pertinent positives and negative per TEO. A 10 point ROS was performed and is otherwise negative. Current Outpatient Medications   Medication Sig Dispense Refill    mometasone 0.1 % External Ointment Apply 1 Application topically daily. 1 each 0    HYDROcodone-acetaminophen 5-325 MG Oral Tab Take 1 tablet by mouth every 6 (six) hours as needed. 30 tablet 0    metFORMIN 500 MG Oral Tab Take 1 tablet (500 mg total) by mouth 2 (two) times daily with meals. 60 tablet 0    chlorhexidine (HIBICLENS) 4 % External Liquid Apply 30 mL topically daily as needed. 946 mL 0    lisinopril 20 MG Oral Tab Take 1 tablet (20 mg total) by mouth daily. Multiple Vitamins-Minerals (CENTRUM MEN OR) Take 1 tablet by mouth daily. Allergies:No Known Allergies    Objective:   Physical Exam  HENT:      Head: Normocephalic. Eyes:      Conjunctiva/sclera: Conjunctivae normal.   Skin:     General: Skin is warm and dry. Neurological:      Mental Status: He is alert and oriented to person, place, and time. Psychiatric:         Mood and Affect: Mood normal.         Behavior: Behavior normal.         Thought Content: Thought content normal.         Judgment: Judgment normal.         Assessment & Plan:   Malignant neoplasm of urinary bladder, unspecified site [facility])  (primary encounter diagnosis)    Patient is a 46year old male who presents to the clinic for a BCG instillation. This is the 6th of 6 instillation. Urine culture done 7/31/23 shows no growth. He denies any symptoms to suggest a UTI. I explained that there is no contraindication to performing his BCG instillation today. Consent has been signed. He confirms he has bleach available at home.   14 fr catheter was placed by RN using sterile technique. Return of clear yellow urine. BCG full strength (50 mg) was gently instilled into the bladder, patient tolerated well. Catheter was them removed. Supplies were disposed of using standard BCG protocol. Follow up in 1 week. Patient was agreeable. No orders of the defined types were placed in this encounter.       Meds This Visit:  Requested Prescriptions      No prescriptions requested or ordered in this encounter       Imaging & Referrals:  None

## 2023-08-02 NOTE — PROGRESS NOTES
-Patient presents for BCG instillation # 6 of 6. I called pt into exam room and introduced myself. I identified patient using his full name and . I explained procedure to pt, pt agreed to proceed. -Positioned patient on the exam bed and he removed his clothing to his ankles, privacy provided with a drape. - I draped the patient with a sterile drape, prepped the area in sterile fashion and donned sterile gloves. I prepped the tip of the penis first with Iodine let this dry and then injected Lidocaine 2% for numbing. Inserted the straight cath coude catheter without problems. - Time out/bladder irrigation/catheter removal performed by AIME Irby. Products used disposed of in a biohazard container. Pt tolerated procedure well. - Pt cleaned area with wipes and dressed himself.  I reminded to pt next appt scheduled on 23 at 1:30 pm, for Cystoscopy.    -Pt verbalized understanding and stated he is aware of apt.   - Encounter complete

## 2023-08-18 ENCOUNTER — OFFICE VISIT (OUTPATIENT)
Dept: OTOLARYNGOLOGY | Facility: CLINIC | Age: 53
End: 2023-08-18

## 2023-08-18 VITALS — BODY MASS INDEX: 36.1 KG/M2 | HEIGHT: 67 IN | WEIGHT: 230 LBS

## 2023-08-18 DIAGNOSIS — Q18.1 ABSCESS OF PREAURICULAR SINUS: Primary | ICD-10-CM

## 2023-08-18 DIAGNOSIS — L02.01 ABSCESS OF PREAURICULAR SINUS: Primary | ICD-10-CM

## 2023-08-18 PROCEDURE — 3008F BODY MASS INDEX DOCD: CPT | Performed by: OTOLARYNGOLOGY

## 2023-08-18 PROCEDURE — 99213 OFFICE O/P EST LOW 20 MIN: CPT | Performed by: OTOLARYNGOLOGY

## 2023-08-18 RX ORDER — SEMAGLUTIDE 0.68 MG/ML
INJECTION, SOLUTION SUBCUTANEOUS
COMMUNITY
Start: 2023-06-15

## 2023-08-18 RX ORDER — DOCUSATE SODIUM 100 MG/1
100 CAPSULE, LIQUID FILLED ORAL 2 TIMES DAILY
COMMUNITY
Start: 2023-07-23

## 2023-08-18 RX ORDER — DOCUSATE SODIUM AND SENNOSIDES 8.6; 5 MG/1; MG/1
1 TABLET, FILM COATED ORAL DAILY
COMMUNITY
Start: 2023-06-16

## 2023-08-22 ENCOUNTER — PROCEDURE (OUTPATIENT)
Dept: SURGERY | Facility: CLINIC | Age: 53
End: 2023-08-22

## 2023-08-22 ENCOUNTER — TELEPHONE (OUTPATIENT)
Dept: SURGERY | Facility: CLINIC | Age: 53
End: 2023-08-22

## 2023-08-22 VITALS — DIASTOLIC BLOOD PRESSURE: 78 MMHG | SYSTOLIC BLOOD PRESSURE: 130 MMHG | HEART RATE: 80 BPM

## 2023-08-22 DIAGNOSIS — D49.4 BLADDER TUMOR: Primary | ICD-10-CM

## 2023-08-22 PROCEDURE — 52000 CYSTOURETHROSCOPY: CPT | Performed by: UROLOGY

## 2023-08-22 PROCEDURE — 3078F DIAST BP <80 MM HG: CPT | Performed by: UROLOGY

## 2023-08-22 PROCEDURE — 3075F SYST BP GE 130 - 139MM HG: CPT | Performed by: UROLOGY

## 2023-08-22 NOTE — TELEPHONE ENCOUNTER
Hi!  This patient needs a transurethral resection of bladder tumor. Should be booked for 45 minutes hours in the next 1-3 weeks. Needs labs as ordered below. Thanks! [address] [facility] [place]    Urology Surgery Scheduling Request    Location: [facility]    Surgeon: Ev Albarran MD    Asst. Surgeon: N/A    Diagnosis: Bladder tumor    Procedure: Cystoscopy transurethral resection of bladder tumor     Anesthesia: General     Time Frame: 1-2 weeks    Time needed: 30 minutes    Special Equipment: Resectoscope    On Call to OR: Ancef     Admission: Day Surgery    Pre-op Testing: CBC, CMP, PT/INR and Urine Culture     Need Pre-op Clearance: none    Estimated Post Op/Follow Up Appt: tbd.     Sabrina Saleem MD

## 2023-08-22 NOTE — PROCEDURES
CYSTOSCOPY (MALE)    PRE-OP DIAGNOSIS: CIS/T1    POST-OP DIAGNOSIS: same    PROCEDURE: Cystsocopy    SURGEON: Darin Suarez MD    ASSISTANT: none     EBL: minimal    FINDINGS:   Urethra: No meatal stenosis, no anterior or posterior urethral strictures, open bladder neck   Prostate: Bilobar coaptation with mild intravesical component   Bladder: Bilateral ureteral orifices in orthotopic position with efflux, no suspicious or concerning erythematous lesions, small 0.5mm papillary lesion at the right dome of bladder  Retroflexion: Mild intravesical component of prostate   Other findings: none    INDICATIONS: CIS/T1    PROCEDURE: Patient was brought to the procedure suite and a timeout was performed identifying the patient,  and procedure being performed. The risks of the procedure were once again detailed to the patient including bleeding, infection, dysuria. The patient agreed to proceed. The patient had a negative urinalysis. No antibiotics were given to patient prior to this procedure. He was placed in a supine position on the table and a flexible cystoscope was inserted per urethra. There were no obvious urethral strictures in the anterior, membranous or posterior urethra. The prostate appeared slightly enlarged. Once in the bladder we performed a full diagnostic/surveillance cystoscopy which demonstrated a small 0.5mm papillary tumor at the right dome of the bladder. On retroflexion we noted no abnormalities; scars did not show any tumor growth. The scope was then carefully removed and once again no urethral abnormalities were noted. There were no complications after this procedure and the patient tolerated the procedure without issue.     IMPRESSION: low grade appearing recurrence in patient with history of high volume CIS/T1 s/p induction BCG, will need TURBT in OR    PLAN:    OR: TURBT  CBC, CHEM7, INR, urine culture  Hold NSAIDS x 10 days prior  cytology

## 2023-08-23 LAB — NON GYNE INTERPRETATION: NEGATIVE

## 2023-08-28 NOTE — TELEPHONE ENCOUNTER
Spoke with patient, scheduled Cystoscopy transurethral resection of bladder tumor, Thursday 09/21/2023, [facility]/outpatient, went over pre-op/lab instructions, all sent to my chart, patient informed to please have all labs done 7-10 days prior to scheduled surgery.

## 2023-09-15 ENCOUNTER — LAB ENCOUNTER (OUTPATIENT)
Dept: LAB | Facility: HOSPITAL | Age: 53
End: 2023-09-15
Attending: UROLOGY
Payer: COMMERCIAL

## 2023-09-15 DIAGNOSIS — D49.4 BLADDER TUMOR: ICD-10-CM

## 2023-09-15 LAB
ANION GAP SERPL CALC-SCNC: 5 MMOL/L (ref 0–18)
BUN BLD-MCNC: 20 MG/DL (ref 7–18)
BUN/CREAT SERPL: 20.8 (ref 10–20)
CALCIUM BLD-MCNC: 9.3 MG/DL (ref 8.5–10.1)
CHLORIDE SERPL-SCNC: 109 MMOL/L (ref 98–112)
CO2 SERPL-SCNC: 25 MMOL/L (ref 21–32)
CREAT BLD-MCNC: 0.96 MG/DL
DEPRECATED RDW RBC AUTO: 43.2 FL (ref 35.1–46.3)
EGFRCR SERPLBLD CKD-EPI 2021: 94 ML/MIN/1.73M2 (ref 60–?)
ERYTHROCYTE [DISTWIDTH] IN BLOOD BY AUTOMATED COUNT: 13.1 % (ref 11–15)
FASTING STATUS PATIENT QL REPORTED: NO
GLUCOSE BLD-MCNC: 200 MG/DL (ref 70–99)
HCT VFR BLD AUTO: 46.2 %
HGB BLD-MCNC: 15.6 G/DL
INR BLD: 0.95 (ref 0.85–1.16)
MCH RBC QN AUTO: 30.2 PG (ref 26–34)
MCHC RBC AUTO-ENTMCNC: 33.8 G/DL (ref 31–37)
MCV RBC AUTO: 89.5 FL
OSMOLALITY SERPL CALC.SUM OF ELEC: 296 MOSM/KG (ref 275–295)
PLATELET # BLD AUTO: 310 10(3)UL (ref 150–450)
POTASSIUM SERPL-SCNC: 4.2 MMOL/L (ref 3.5–5.1)
PROTHROMBIN TIME: 12.6 SECONDS (ref 11.6–14.8)
RBC # BLD AUTO: 5.16 X10(6)UL
SODIUM SERPL-SCNC: 139 MMOL/L (ref 136–145)
WBC # BLD AUTO: 8.3 X10(3) UL (ref 4–11)

## 2023-09-15 PROCEDURE — 85027 COMPLETE CBC AUTOMATED: CPT

## 2023-09-15 PROCEDURE — 87086 URINE CULTURE/COLONY COUNT: CPT

## 2023-09-15 PROCEDURE — 36415 COLL VENOUS BLD VENIPUNCTURE: CPT

## 2023-09-15 PROCEDURE — 80048 BASIC METABOLIC PNL TOTAL CA: CPT

## 2023-09-15 PROCEDURE — 85610 PROTHROMBIN TIME: CPT
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
## 2023-09-21 ENCOUNTER — ANESTHESIA (OUTPATIENT)
Dept: SURGERY | Facility: HOSPITAL | Age: 53
End: 2023-09-21
Payer: COMMERCIAL

## 2023-09-21 ENCOUNTER — ANESTHESIA EVENT (OUTPATIENT)
Dept: SURGERY | Facility: HOSPITAL | Age: 53
End: 2023-09-21
Payer: COMMERCIAL

## 2023-09-21 ENCOUNTER — HOSPITAL ENCOUNTER (OUTPATIENT)
Facility: HOSPITAL | Age: 53
Setting detail: HOSPITAL OUTPATIENT SURGERY
Discharge: HOME | End: 2023-09-21
Attending: UROLOGY | Admitting: UROLOGY
Payer: COMMERCIAL

## 2023-09-21 VITALS
DIASTOLIC BLOOD PRESSURE: 75 MMHG | WEIGHT: 242 LBS | SYSTOLIC BLOOD PRESSURE: 116 MMHG | RESPIRATION RATE: 18 BRPM | TEMPERATURE: 98 F | BODY MASS INDEX: 37.98 KG/M2 | HEART RATE: 75 BPM | HEIGHT: 67 IN | OXYGEN SATURATION: 94 %

## 2023-09-21 DIAGNOSIS — D49.4 BLADDER TUMOR: ICD-10-CM

## 2023-09-21 LAB — GLUCOSE BLDC GLUCOMTR-MCNC: 127 MG/DL (ref 70–99)

## 2023-09-21 PROCEDURE — 0TBB8ZX EXCISION OF BLADDER, VIA NATURAL OR ARTIFICIAL OPENING ENDOSCOPIC, DIAGNOSTIC: ICD-10-PCS | Performed by: UROLOGY

## 2023-09-21 PROCEDURE — 52234 CYSTOSCOPY AND TREATMENT: CPT | Performed by: UROLOGY

## 2023-09-21 RX ORDER — MORPHINE SULFATE 4 MG/ML
4 INJECTION, SOLUTION INTRAMUSCULAR; INTRAVENOUS EVERY 10 MIN PRN
Status: DISCONTINUED | OUTPATIENT
Start: 2023-09-21 | End: 2023-09-21

## 2023-09-21 RX ORDER — ROCURONIUM BROMIDE 10 MG/ML
INJECTION, SOLUTION INTRAVENOUS AS NEEDED
Status: DISCONTINUED | OUTPATIENT
Start: 2023-09-21 | End: 2023-09-21 | Stop reason: SURG

## 2023-09-21 RX ORDER — CEFAZOLIN SODIUM/WATER 2 G/20 ML
2 SYRINGE (ML) INTRAVENOUS
Status: COMPLETED | OUTPATIENT
Start: 2023-09-21 | End: 2023-09-21

## 2023-09-21 RX ORDER — MORPHINE SULFATE 4 MG/ML
2 INJECTION, SOLUTION INTRAMUSCULAR; INTRAVENOUS EVERY 10 MIN PRN
Status: DISCONTINUED | OUTPATIENT
Start: 2023-09-21 | End: 2023-09-21

## 2023-09-21 RX ORDER — DEXTROSE MONOHYDRATE 25 G/50ML
50 INJECTION, SOLUTION INTRAVENOUS
Status: DISCONTINUED | OUTPATIENT
Start: 2023-09-21 | End: 2023-09-21 | Stop reason: HOSPADM

## 2023-09-21 RX ORDER — NICOTINE POLACRILEX 4 MG
15 LOZENGE BUCCAL
Status: DISCONTINUED | OUTPATIENT
Start: 2023-09-21 | End: 2023-09-21

## 2023-09-21 RX ORDER — ACETAMINOPHEN 500 MG
1000 TABLET ORAL ONCE
Status: COMPLETED | OUTPATIENT
Start: 2023-09-21 | End: 2023-09-21

## 2023-09-21 RX ORDER — FAMOTIDINE 20 MG/1
20 TABLET, FILM COATED ORAL ONCE
Status: COMPLETED | OUTPATIENT
Start: 2023-09-21 | End: 2023-09-21

## 2023-09-21 RX ORDER — LIDOCAINE HYDROCHLORIDE 10 MG/ML
INJECTION, SOLUTION EPIDURAL; INFILTRATION; INTRACAUDAL; PERINEURAL AS NEEDED
Status: DISCONTINUED | OUTPATIENT
Start: 2023-09-21 | End: 2023-09-21 | Stop reason: SURG

## 2023-09-21 RX ORDER — DEXAMETHASONE SODIUM PHOSPHATE 4 MG/ML
VIAL (ML) INJECTION AS NEEDED
Status: DISCONTINUED | OUTPATIENT
Start: 2023-09-21 | End: 2023-09-21 | Stop reason: SURG

## 2023-09-21 RX ORDER — ONDANSETRON 2 MG/ML
INJECTION INTRAMUSCULAR; INTRAVENOUS AS NEEDED
Status: DISCONTINUED | OUTPATIENT
Start: 2023-09-21 | End: 2023-09-21 | Stop reason: SURG

## 2023-09-21 RX ORDER — INSULIN ASPART 100 [IU]/ML
INJECTION, SOLUTION INTRAVENOUS; SUBCUTANEOUS ONCE
Status: DISCONTINUED | OUTPATIENT
Start: 2023-09-21 | End: 2023-09-21

## 2023-09-21 RX ORDER — HYDROMORPHONE HYDROCHLORIDE 1 MG/ML
0.4 INJECTION, SOLUTION INTRAMUSCULAR; INTRAVENOUS; SUBCUTANEOUS EVERY 5 MIN PRN
Status: DISCONTINUED | OUTPATIENT
Start: 2023-09-21 | End: 2023-09-21

## 2023-09-21 RX ORDER — SULFAMETHOXAZOLE AND TRIMETHOPRIM 800; 160 MG/1; MG/1
1 TABLET ORAL 2 TIMES DAILY
Qty: 6 TABLET | Refills: 0 | Status: SHIPPED | OUTPATIENT
Start: 2023-09-21 | End: 2023-09-24

## 2023-09-21 RX ORDER — HYDROMORPHONE HYDROCHLORIDE 1 MG/ML
0.2 INJECTION, SOLUTION INTRAMUSCULAR; INTRAVENOUS; SUBCUTANEOUS EVERY 5 MIN PRN
Status: DISCONTINUED | OUTPATIENT
Start: 2023-09-21 | End: 2023-09-21

## 2023-09-21 RX ORDER — SODIUM CHLORIDE, SODIUM LACTATE, POTASSIUM CHLORIDE, CALCIUM CHLORIDE 600; 310; 30; 20 MG/100ML; MG/100ML; MG/100ML; MG/100ML
INJECTION, SOLUTION INTRAVENOUS CONTINUOUS
Status: DISCONTINUED | OUTPATIENT
Start: 2023-09-21 | End: 2023-09-21

## 2023-09-21 RX ORDER — METOCLOPRAMIDE 10 MG/1
10 TABLET ORAL ONCE
Status: COMPLETED | OUTPATIENT
Start: 2023-09-21 | End: 2023-09-21

## 2023-09-21 RX ORDER — NALOXONE HYDROCHLORIDE 0.4 MG/ML
0.08 INJECTION, SOLUTION INTRAMUSCULAR; INTRAVENOUS; SUBCUTANEOUS AS NEEDED
Status: DISCONTINUED | OUTPATIENT
Start: 2023-09-21 | End: 2023-09-21

## 2023-09-21 RX ORDER — HYDROMORPHONE HYDROCHLORIDE 1 MG/ML
0.6 INJECTION, SOLUTION INTRAMUSCULAR; INTRAVENOUS; SUBCUTANEOUS EVERY 5 MIN PRN
Status: DISCONTINUED | OUTPATIENT
Start: 2023-09-21 | End: 2023-09-21

## 2023-09-21 RX ORDER — PROCHLORPERAZINE EDISYLATE 5 MG/ML
5 INJECTION INTRAMUSCULAR; INTRAVENOUS EVERY 8 HOURS PRN
Status: DISCONTINUED | OUTPATIENT
Start: 2023-09-21 | End: 2023-09-21

## 2023-09-21 RX ORDER — NICOTINE POLACRILEX 4 MG
15 LOZENGE BUCCAL
Status: DISCONTINUED | OUTPATIENT
Start: 2023-09-21 | End: 2023-09-21 | Stop reason: HOSPADM

## 2023-09-21 RX ORDER — MORPHINE SULFATE 10 MG/ML
6 INJECTION, SOLUTION INTRAMUSCULAR; INTRAVENOUS EVERY 10 MIN PRN
Status: DISCONTINUED | OUTPATIENT
Start: 2023-09-21 | End: 2023-09-21

## 2023-09-21 RX ORDER — ONDANSETRON 2 MG/ML
4 INJECTION INTRAMUSCULAR; INTRAVENOUS EVERY 6 HOURS PRN
Status: DISCONTINUED | OUTPATIENT
Start: 2023-09-21 | End: 2023-09-21

## 2023-09-21 RX ORDER — DEXTROSE MONOHYDRATE 25 G/50ML
50 INJECTION, SOLUTION INTRAVENOUS
Status: DISCONTINUED | OUTPATIENT
Start: 2023-09-21 | End: 2023-09-21

## 2023-09-21 RX ORDER — NICOTINE POLACRILEX 4 MG
30 LOZENGE BUCCAL
Status: DISCONTINUED | OUTPATIENT
Start: 2023-09-21 | End: 2023-09-21 | Stop reason: HOSPADM

## 2023-09-21 RX ORDER — NICOTINE POLACRILEX 4 MG
30 LOZENGE BUCCAL
Status: DISCONTINUED | OUTPATIENT
Start: 2023-09-21 | End: 2023-09-21

## 2023-09-21 RX ADMIN — ROCURONIUM BROMIDE 10 MG: 10 INJECTION, SOLUTION INTRAVENOUS at 11:18:00

## 2023-09-21 RX ADMIN — SODIUM CHLORIDE, SODIUM LACTATE, POTASSIUM CHLORIDE, CALCIUM CHLORIDE: 600; 310; 30; 20 INJECTION, SOLUTION INTRAVENOUS at 11:30:00

## 2023-09-21 RX ADMIN — LIDOCAINE HYDROCHLORIDE 50 MG: 10 INJECTION, SOLUTION EPIDURAL; INFILTRATION; INTRACAUDAL; PERINEURAL at 11:18:00

## 2023-09-21 RX ADMIN — DEXAMETHASONE SODIUM PHOSPHATE 4 MG: 4 MG/ML VIAL (ML) INJECTION at 11:29:00

## 2023-09-21 RX ADMIN — ONDANSETRON 4 MG: 2 INJECTION INTRAMUSCULAR; INTRAVENOUS at 11:43:00

## 2023-09-21 RX ADMIN — CEFAZOLIN SODIUM/WATER 2 G: 2 G/20 ML SYRINGE (ML) INTRAVENOUS at 11:27:00

## 2023-09-21 NOTE — ANESTHESIA PROCEDURE NOTES
Airway  Date/Time: 9/21/2023 11:21 AM  Urgency: elective    Airway not difficult    General Information and Staff    Patient location during procedure: OR  Anesthesiologist: Shiela Reese MD  Performed: anesthesiologist   Performed by: Shiela Reese MD  Authorized by: Shiela Reese MD      Indications and Patient Condition  Indications for airway management: anesthesia  Spontaneous Ventilation: absent  Sedation level: deep  Preoxygenated: yes  Patient position: sniffing  Mask difficulty assessment: 3 - difficult mask (inadequate, unstable or two providers) +/- NMBA    Final Airway Details  Final airway type: endotracheal airway      Successful airway: ETT  Cuffed: yes   Successful intubation technique: Video laryngoscopy  Facilitating devices/methods: intubating stylet and cricoid pressure  Endotracheal tube insertion site: oral  Blade type: Chu.   Blade size: #4  ETT size (mm): 7.5    Cormack-Lehane Classification: grade IIB - view of arytenoids or posterior of glottis only  Placement verified by: capnometry   Cuff volume (mL): 7  Measured from: lips  ETT to lips (cm): 23  Number of attempts at approach: 1  Number of other approaches attempted: 0    Additional Comments  VCV-III with DL with MAC-4   Switched to Chu, VCV IIb

## 2023-09-21 NOTE — OPERATIVE REPORT
OPERATIVE REPORT  DATE OF SURGERY: 9/21/2023  PREOPERATIVE DIAGNOSIS: Bladder tumor  POSTOPERATIVE DIAGNOSIS: Same  PROCEDURE: Cystoscopy, transurethral resection of bladder tumor 1cm  SURGEON: Lencho Jacobs MD  ASSISTANT: esme  ANESTHESIA: General ET tube  FLUIDS: Per anesthesia  URINE OUTPUT: Not applicable  ESTIMATED BLOOD LOSS: 3cc  SPECIMENS: bladder biopsy anterior wall  CONDITION: Stable to PACU    INDICATIONS: 53M with CIS/T1 s/p BCG with recurrence on first cystosocpy. Recommend TURBT. PROCEDURE: The patient was taken to the operating room and given general anesthesia and then placed in yellowfin stirrups. Patient received ancef antibiotic coverage before starting the case. At this point, sounds were used to dilate the meatus to 30 [place]. The resectoscope passed easily in the meatus and into the bladder. A full cystoscopy was performed which demonstrated a small papillary lesion 0.5cm in size. We then used the biopsy forceps to biopsy it. We resectoscope loop to fulgurate the area which was 1cm. We used the loop to fulgurate the area completely. With the bladder decompressed there was no further bleeding. The specimens were removed and sent to pathology. The bladder was emptied. The patient was then awakened and transported to the PACU in good condition.     IMPRESSION: Status post TURBT    PLAN:  1. RTC at that time for pathology discussion 2 weeks  2. 3 days abx

## 2023-10-02 ENCOUNTER — TELEPHONE (OUTPATIENT)
Dept: SURGERY | Facility: CLINIC | Age: 53
End: 2023-10-02

## 2023-10-02 ENCOUNTER — OFFICE VISIT (OUTPATIENT)
Dept: SURGERY | Facility: CLINIC | Age: 53
End: 2023-10-02

## 2023-10-02 DIAGNOSIS — C67.9 MALIGNANT NEOPLASM OF URINARY BLADDER, UNSPECIFIED SITE (HCC): ICD-10-CM

## 2023-10-02 DIAGNOSIS — D49.4 BLADDER TUMOR: Primary | ICD-10-CM

## 2023-10-02 NOTE — TELEPHONE ENCOUNTER
Pt was schd for BCG 50 mg treatment for his bladder cancer starting on 11/1/23 for 6 treatments, weekly. I also created the chemo consent.

## 2023-10-02 NOTE — PROGRESS NOTES
Yoseph Palacio MD  Department of Urology  [facility]    T: [phone]  F: [phone]    To: No primary care provider on file. No primary provider on file. Re: Lexi Iqbal   MRN: [id number]  : 1970    Dear No primary care provider on file.,    Today I had the pleasure of seeing Lexi Iqbal in my clinic. As you know, Mr. Alin Mcfarlane is a pleasant 46year old year old male who I am seeing for TURBT pathology results. Patient was last seen in this department on 2023. Briefly, patient underwent transurethral resection of bladder tumor with me initially in 2023. It was for a tumor that was multifocal over an area of greater than 5 cm. Pathology returned high-grade T1. He did have a repeat TURBT that showed high-grade TA, T1, CIS. On restaging TURBT    Complete resection was visually seen. He underwent BCG. I did discuss upfront cystectomy with him given his high-grade disease on repeat TURBT but he declined at that time. He completed the maintenance course of BCG and underwent cystoscopy on 2023 in the office. It demonstrated a small 0.5 mm papillary lesion of the right dome of the bladder. We performed an OR cystoscopy that demonstrated no abnormalities in the bladder other than a small 0.5 mm small papillary lesion at the dome of the bladder. Pathology returned high-grade TA    Final Diagnosis:      Bladder, anterior wall; biopsy:   High-grade papillary urothelial carcinoma, noninvasive. Muscularis propria is not identified.           PAST MEDICAL HISTORY:  Past Medical History:   Diagnosis Date    Calculus of kidney     Cancer ([facility] [address]     bladder    Diabetes ([facility] [address]     High blood pressure     Visual impairment     glasses        PAST SURGICAL HISTORY:  Past Surgical History:   Procedure Laterality Date    FRACTURE SURGERY Right     arm    OTHER SURGICAL HISTORY Right     biceps muscle torn         ALLERGIES:  No Known Allergies MEDICATIONS:  Current Outpatient Medications   Medication Instructions    chlorhexidine (HIBICLENS) 4 % External Liquid 30 mL, Topical, Daily as needed    lisinopril (PRINIVIL; ZESTRIL) 20 mg, Oral, Daily    metFORMIN (GLUCOPHAGE) 500 mg, Oral, 2 times daily with meals    mometasone 0.1 % External Ointment 1 Application, Topical, Daily    Multiple Vitamins-Minerals (CENTRUM MEN OR) 1 tablet, Oral, Daily    OZEMPIC, 0.25 OR 0.5 MG/DOSE, 2 MG/3ML Subcutaneous Solution Pen-injector inject 0.25 mg subcutaneously weekly for 30 days and then inject 0.5 mg weekly Subcutaneously for 2 weeks. FAMILY HISTORY:  Family History   Problem Relation Age of Onset    Hypertension Father     Cancer Mother     Hypertension Mother     Hypertension Brother         SOCIAL HISTORY:  Social History     Socioeconomic History    Marital status:    Tobacco Use    Smoking status: Former     Packs/day: 1.50     Years: 35.00     Additional pack years: 0.00     Total pack years: 52.50     Types: Cigarettes    Smokeless tobacco: Never   Vaping Use    Vaping Use: Never used   Substance and Sexual Activity    Alcohol use: Not Currently    Drug use: Not Currently   Social Determinants of Health  Financial Resource Strain: Low Risk  (6/9/2023)      Financial Resource Strain          Difficulty of Paying Living Expenses: Not very hard          Med Affordability: No  Transportation Needs: No Transportation Needs (6/9/2023)      Transportation Needs          Lack of Transportation: No       PHYSICAL EXAMINATION:  There were no vitals filed for this visit.   CONSTITUTIONAL: No apparent distress, cooperative and communicative  NEUROLOGIC: Alert and oriented   HEAD: Normocephalic, atraumatic   EYES: Sclera non-icteric   ENT: Hearing intact, moist mucous membranes   NECK: No obvious goiter or masses   RESPIRATORY: Normal respiratory effort, Nonlabored breathing on room air  SKIN: No evident rashes   ABDOMEN: Soft, nontender, nondistended, no rebound tenderness, no guarding, no masses      REVIEW OF SYSTEMS:    A comprehensive 10-point review of systems was completed. Pertinent positives and negatives are noted in the the HPI. LABORATORY DATA:  Final Diagnosis:      Bladder, anterior wall; biopsy:   High-grade papillary urothelial carcinoma, noninvasive. Muscularis propria is not identified. For  purposes, this case was reviewed by a second pathologist who concurred with the diagnosis. IMAGING REVIEW:  Narrative   PROCEDURE:  CT ABDOMEN PELVIS IV CONTRAST, NO ORAL (ER)     LOCATION:  [facility]       COMPARISON:  None. INDICATIONS:  hematuria with clots, denies thinners     TECHNIQUE:  CT scanning was performed from the dome of the diaphragm to the pubic symphysis with non-ionic intravenous contrast material. Post contrast coronal MPR imaging was performed. Dose reduction techniques were used. Dose information is  transmitted to the [facility]) NRDR ([facility] [address]) which includes the Dose Index Registry. PATIENT STATED HISTORY:(As transcribed by Technologist)  Pt presents with hematuria for 2 months intermittently. Seen at PCP today and was told to follow up with urology. Pt reports bilateral flank \"soreness\". Denies abd pain, n/v/d, or fevers      CONTRAST USED:  100cc of Isovue 370     FINDINGS:    LIVER:  No enlargement, atrophy, abnormal density, or significant focal lesion. BILIARY:  No visible dilatation or calcification. PANCREAS:  No lesion, fluid collection, ductal dilatation, or atrophy. SPLEEN:  No enlargement or focal lesion. KIDNEYS:  Non-obstructing 3-4 mm calculus in the upper pole left kidney. Additional tiny punctate 1-2 mm nonobstructing calculi in the upper pole left kidney in the mid left kidney are noted. Cyst in the lower pole the right kidney measures 4 cm. ADRENALS:  No mass or enlargement.     AORTA/VASCULAR:  No aneurysm or dissection. RETROPERITONEUM:  No mass or adenopathy. BOWEL/MESENTERY:  There is diverticulosis of the colon without evidence of acute diverticulitis. The appendix is normal.  ABDOMINAL WALL:  No mass or hernia. URINARY BLADDER:  Trabeculation and thickening of the bladder wall is noted. PELVIC NODES:  No adenopathy. PELVIC ORGANS:  No visible mass. Pelvic organs appropriate for patient age. BONES:  No bony lesion or fracture. LUNG BASES:  No visible pulmonary or pleural disease. OTHER:  Negative. Impression   CONCLUSION:       1. Nonobstructing small calculi the left kidney are noted. No hydronephrosis. No filling defect in the renal collecting systems or ureters. 2. Bladder wall thickening and trabeculation of the bladder wall is noted. This may be sequelae of prior cystitis. Underlying lesion in the bladder cannot be excluded. Dictated by (CST): Kassy Rojo MD on 4/04/2023 at 10:32 PM      Finalized by (CST): Kassy Rojo MD on 4/04/2023 at 10:36 PM          OTHER RELEVANT DATA:   none     IMPRESSION: High-grade TA, T1 and CIS status post induction full-strength BCG with recurrent high-grade TA on repeat TURBT post BCG. I discussed pathology with patient. Stated that early upfront cystectomy is an option for him versus reinduction BCG as his pathology has not necessarily progressed and bladder looks relatively good from a gross standpoint. Patient opted for reinduction BCG     PLAN:  Reinduction BCG full-strength 6 weeks to start in 4 to 6 weeks  Cystoscopy in 3 months  Cytology prior to cystoscopy    Thank you for referring this very pleasant patient to my clinic. If you have any questions or concerns, please do not hesitate to contact me.     Sincerely,  Fanny Ewing MD    20 minutes were spent on this patient at this visit obtaining a history, reviewing medical records, developing a treatment plan, counseling and discussing treatment strategy with patient, coordination of care and documentation. POSTOP    The 21st Century Cures Act makes medical notes available to patients in the interest of transparency. However, please be advised that this is a medical document. It is intended as a peer to peer communication. It is written in medical language and may contain abbreviations or verbiage that are technical and unfamiliar. It may appear blunt or direct. Medical documents are intended to carry relevant information, facts as evident, and the clinical opinion of the practitioner.

## 2023-10-30 ENCOUNTER — LAB ENCOUNTER (OUTPATIENT)
Dept: LAB | Facility: HOSPITAL | Age: 53
End: 2023-10-30
Attending: UROLOGY
Payer: COMMERCIAL

## 2023-10-30 DIAGNOSIS — D49.4 BLADDER TUMOR: ICD-10-CM

## 2023-10-30 LAB
BILIRUB UR QL: NEGATIVE
CLARITY UR: CLEAR
COLOR UR: YELLOW
GLUCOSE UR-MCNC: NORMAL MG/DL
HGB UR QL STRIP.AUTO: NEGATIVE
LEUKOCYTE ESTERASE UR QL STRIP.AUTO: NEGATIVE
NITRITE UR QL STRIP.AUTO: NEGATIVE
PH UR: 5.5 [PH] (ref 5–8)
PROT UR-MCNC: 20 MG/DL
SP GR UR STRIP: 1.03 (ref 1–1.03)
UROBILINOGEN UR STRIP-ACNC: NORMAL

## 2023-10-30 PROCEDURE — 81001 URINALYSIS AUTO W/SCOPE: CPT

## 2023-11-01 ENCOUNTER — PROCEDURE (OUTPATIENT)
Dept: SURGERY | Facility: CLINIC | Age: 53
End: 2023-11-01
Payer: COMMERCIAL

## 2023-11-01 VITALS — DIASTOLIC BLOOD PRESSURE: 82 MMHG | HEART RATE: 96 BPM | SYSTOLIC BLOOD PRESSURE: 125 MMHG

## 2023-11-01 DIAGNOSIS — C67.9 MALIGNANT NEOPLASM OF URINARY BLADDER, UNSPECIFIED SITE (HCC): Primary | ICD-10-CM

## 2023-11-01 PROCEDURE — 3079F DIAST BP 80-89 MM HG: CPT | Performed by: UROLOGY

## 2023-11-01 PROCEDURE — 3074F SYST BP LT 130 MM HG: CPT | Performed by: UROLOGY

## 2023-11-01 PROCEDURE — 51720 TREATMENT OF BLADDER LESION: CPT | Performed by: PHYSICIAN ASSISTANT

## 2023-11-01 NOTE — PROGRESS NOTES
Patient presents for instillation 1 of 6 BCG. Identified patient using his full name and . Positioned patient on the bed and he removed his clothing below the waist. Explained the procedure to the patient and he verbalized understanding. He had no questions. Pt signed his consent, as this was his first of 6 treatments.     Pt was then prepped sterilly by ZURI Romero.

## 2023-11-01 NOTE — PROGRESS NOTES
Subjective:   Patient ID: Jimmy Bain is a 46year old male. MARY CARMEN Anglin is a 46year old male who presents to the clinic for BCG instillation. This will be the patients 1st of 6 instillations of BCG. Patient was laying in a supine position, Penis was prepped and draped in sterile fashion, prepped with betadine. Urojet (Lidocaine HCL2%- 10mL  inserted into the urethra . 14 french arceo catheter was inserted , there was yellow urine output. History/Other:   Review of Systems  Current Outpatient Medications   Medication Sig Dispense Refill    OZEMPIC, 0.25 OR 0.5 MG/DOSE, 2 MG/3ML Subcutaneous Solution Pen-injector inject 0.25 mg subcutaneously weekly for 30 days and then inject 0.5 mg weekly Subcutaneously for 2 weeks. mometasone 0.1 % External Ointment Apply 1 Application topically daily. (Patient not taking: Reported on 8/18/2023) 1 each 0    metFORMIN 500 MG Oral Tab Take 1 tablet (500 mg total) by mouth 2 (two) times daily with meals. 60 tablet 0    chlorhexidine (HIBICLENS) 4 % External Liquid Apply 30 mL topically daily as needed. 946 mL 0    lisinopril 20 MG Oral Tab Take 1 tablet (20 mg total) by mouth daily. Multiple Vitamins-Minerals (CENTRUM MEN OR) Take 1 tablet by mouth daily. Allergies:No Known Allergies    Objective:   Physical Exam  Constitutional:       Appearance: Normal appearance. HENT:      Head: Normocephalic. Eyes:      General: No scleral icterus. Extraocular Movements: Extraocular movements intact. Pulmonary:      Effort: Pulmonary effort is normal. No respiratory distress. Neurological:      Mental Status: He is alert and oriented to person, place, and time. Psychiatric:         Mood and Affect: Mood normal.         Behavior: Behavior normal.         Assessment & Plan:   Malignant neoplasm of urinary bladder, unspecified site ([facility] [address]  (primary encounter diagnosis)    No orders of the defined types were placed in this encounter.     Brittani Chung Jessy Dudley is a 46year old male who presents to the clinic for BCG instillation. This is the 1st of 6 instillation. Urine culture was negative. He denies any symptoms to suggest a UTI. I explained that there is no contraindication to performing his BCG instillation today. Consent has been signed. He confirms he has bleach available at home. 14 fr catheter was placed by RN using sterile technique. Return of clear yellow urine. BCG full strength (50 mg) was gently instilled into the bladder, patient tolerated well. Catheter was them removed. Supplies were disposed of using standard BCG protocol. Follow up in 1 week. Patient was agreeable.        Meds This Visit:  Requested Prescriptions      No prescriptions requested or ordered in this encounter       Imaging & Referrals:  None

## 2023-11-06 ENCOUNTER — LAB ENCOUNTER (OUTPATIENT)
Dept: LAB | Facility: HOSPITAL | Age: 53
End: 2023-11-06
Attending: UROLOGY
Payer: COMMERCIAL

## 2023-11-06 DIAGNOSIS — D49.4 BLADDER TUMOR: ICD-10-CM

## 2023-11-06 LAB
BILIRUB UR QL: NEGATIVE
CLARITY UR: CLEAR
GLUCOSE UR-MCNC: NORMAL MG/DL
KETONES UR-MCNC: NEGATIVE MG/DL
LEUKOCYTE ESTERASE UR QL STRIP.AUTO: 250
NITRITE UR QL STRIP.AUTO: NEGATIVE
PH UR: 5 [PH] (ref 5–8)
RBC #/AREA URNS AUTO: >10 /HPF
SP GR UR STRIP: 1.03 (ref 1–1.03)
UROBILINOGEN UR STRIP-ACNC: NORMAL

## 2023-11-06 PROCEDURE — 88108 CYTOPATH CONCENTRATE TECH: CPT

## 2023-11-06 PROCEDURE — 81001 URINALYSIS AUTO W/SCOPE: CPT

## 2023-11-06 PROCEDURE — 87086 URINE CULTURE/COLONY COUNT: CPT

## 2023-11-08 ENCOUNTER — PROCEDURE (OUTPATIENT)
Dept: SURGERY | Facility: CLINIC | Age: 53
End: 2023-11-08
Payer: COMMERCIAL

## 2023-11-08 VITALS — RESPIRATION RATE: 18 BRPM | SYSTOLIC BLOOD PRESSURE: 130 MMHG | DIASTOLIC BLOOD PRESSURE: 89 MMHG | HEART RATE: 90 BPM

## 2023-11-08 DIAGNOSIS — C67.9 MALIGNANT NEOPLASM OF URINARY BLADDER, UNSPECIFIED SITE (HCC): Primary | ICD-10-CM

## 2023-11-08 DIAGNOSIS — C67.8 MALIGNANT NEOPLASM OF OVERLAPPING SITES OF BLADDER (HCC): Primary | ICD-10-CM

## 2023-11-08 PROCEDURE — 3079F DIAST BP 80-89 MM HG: CPT | Performed by: PHYSICIAN ASSISTANT

## 2023-11-08 PROCEDURE — 51720 TREATMENT OF BLADDER LESION: CPT | Performed by: PHYSICIAN ASSISTANT

## 2023-11-08 PROCEDURE — 3075F SYST BP GE 130 - 139MM HG: CPT | Performed by: PHYSICIAN ASSISTANT

## 2023-11-08 NOTE — PROGRESS NOTES
I determined patient's urine test is within range. I also determined that no PA was needed so I obtained the med and went to get the patient from the waiting area. I called the patient by first name and brought him to the exam room. I introduced myself to the pt and I verified the spelling of his last name and verified his . I explained to the pt that I would be placing his catheter for his BCG TX. I then asked the pt to step up onto the exam table and lower his bottom clothing to his ankles. I then placed the pt in supine position on the exam table and proceeded to prep and drape the pt in sterile fashion and I proceeded to instill about 2/3 of the Urojet (Lidocaine HCL 2 %- 10 ml ) into the pt's urethra and I then constricted the head of the penis for about 1-2 min to allow sufficient numbing. I then proceeded to insert a 14 Fr coude tipped catheter into the bladder and received about 30 ml of light yellow colored urine. I went out of the room to get Mal ESQUIVEL and we returned to the room together and we then conducted the timeout and then Amal D instilled the BCG 50 mg without incident and pt tolerated it well. Amal D then removed the catheter and all of the chemo waste was disposed of into the yellow chemo bag. I gave pt wipes to clean up and told him to redress and that I would return in a minute. I disposed of the chemo bag into the yellow chemo bucket in the dirty utility room. I went back to the room where the pt was and reviewed the BCG aftercare instructions.   Pt verbalized understanding than I sent the pt on his way, and I cleaned the exam room with the orange topped bleach wipes as per protocol 61.7

## 2023-11-08 NOTE — PROGRESS NOTES
Subjective:   Patient ID: Blas Osorio is a 46year old male. HPI  Sydni Gamboa is a 46year old male who presents to the clinic for BCG instillation. This will be the patients 2nd of 6 instillations of BCG. History/Other:   Review of Systems  Current Outpatient Medications   Medication Sig Dispense Refill    OZEMPIC, 0.25 OR 0.5 MG/DOSE, 2 MG/3ML Subcutaneous Solution Pen-injector inject 0.25 mg subcutaneously weekly for 30 days and then inject 0.5 mg weekly Subcutaneously for 2 weeks. mometasone 0.1 % External Ointment Apply 1 Application topically daily. (Patient not taking: Reported on 8/18/2023) 1 each 0    metFORMIN 500 MG Oral Tab Take 1 tablet (500 mg total) by mouth 2 (two) times daily with meals. 60 tablet 0    chlorhexidine (HIBICLENS) 4 % External Liquid Apply 30 mL topically daily as needed. 946 mL 0    lisinopril 20 MG Oral Tab Take 1 tablet (20 mg total) by mouth daily. Multiple Vitamins-Minerals (CENTRUM MEN OR) Take 1 tablet by mouth daily. Allergies:No Known Allergies    Objective:   Physical Exam  Constitutional:       Appearance: Normal appearance. HENT:      Head: Normocephalic. Eyes:      General: No scleral icterus. Extraocular Movements: Extraocular movements intact. Pulmonary:      Effort: Pulmonary effort is normal. No respiratory distress. Neurological:      Mental Status: He is alert and oriented to person, place, and time. Psychiatric:         Mood and Affect: Mood normal.         Behavior: Behavior normal.         Assessment & Plan:   No diagnosis found. No orders of the defined types were placed in this encounter. Sydni Gamboa is a 46year old male who presents to the clinic for BCG instillation. This is the 2nd of 6 instillation. Urine culture was negative. He denies any symptoms to suggest a UTI. I explained that there is no contraindication to performing his BCG instillation today. Consent has been signed.   He confirms he has bleach available at home. 14 fr catheter was placed by RN using sterile technique. Return of clear yellow urine. BCG full strength (50 mg) was gently instilled into the bladder, patient tolerated well. Catheter was them removed. Supplies were disposed of using standard BCG protocol. Follow up in 1 week. Patient was agreeable.   Cysto scheduled for 1/3/2024       Meds This Visit:  Requested Prescriptions      No prescriptions requested or ordered in this encounter       Imaging & Referrals:  None    Leroy Snyder  November 08, 2023

## 2023-11-09 ENCOUNTER — PATIENT MESSAGE (OUTPATIENT)
Dept: SURGERY | Facility: CLINIC | Age: 53
End: 2023-11-09

## 2023-11-13 ENCOUNTER — LAB ENCOUNTER (OUTPATIENT)
Dept: LAB | Facility: HOSPITAL | Age: 53
End: 2023-11-13
Attending: UROLOGY
Payer: COMMERCIAL

## 2023-11-13 DIAGNOSIS — C67.8 MALIGNANT NEOPLASM OF OVERLAPPING SITES OF BLADDER (HCC): ICD-10-CM

## 2023-11-13 DIAGNOSIS — D49.4 BLADDER TUMOR: ICD-10-CM

## 2023-11-13 LAB
BILIRUB UR QL: NEGATIVE
CLARITY UR: CLEAR
GLUCOSE UR-MCNC: 500 MG/DL
KETONES UR-MCNC: NEGATIVE MG/DL
LEUKOCYTE ESTERASE UR QL STRIP.AUTO: 75
NITRITE UR QL STRIP.AUTO: NEGATIVE
PH UR: 5 [PH] (ref 5–8)
PROT UR-MCNC: NEGATIVE MG/DL
SP GR UR STRIP: 1.02 (ref 1–1.03)
UROBILINOGEN UR STRIP-ACNC: NORMAL

## 2023-11-13 PROCEDURE — 87086 URINE CULTURE/COLONY COUNT: CPT

## 2023-11-13 PROCEDURE — 88108 CYTOPATH CONCENTRATE TECH: CPT

## 2023-11-13 PROCEDURE — 81001 URINALYSIS AUTO W/SCOPE: CPT

## 2023-11-14 LAB — NON GYNE INTERPRETATION: NEGATIVE

## 2023-11-15 ENCOUNTER — PROCEDURE (OUTPATIENT)
Dept: SURGERY | Facility: CLINIC | Age: 53
End: 2023-11-15
Payer: COMMERCIAL

## 2023-11-15 VITALS — SYSTOLIC BLOOD PRESSURE: 146 MMHG | DIASTOLIC BLOOD PRESSURE: 96 MMHG | HEART RATE: 80 BPM

## 2023-11-15 PROCEDURE — 3077F SYST BP >= 140 MM HG: CPT | Performed by: PHYSICIAN ASSISTANT

## 2023-11-15 PROCEDURE — 51720 TREATMENT OF BLADDER LESION: CPT | Performed by: PHYSICIAN ASSISTANT

## 2023-11-15 PROCEDURE — 3080F DIAST BP >= 90 MM HG: CPT | Performed by: PHYSICIAN ASSISTANT

## 2023-11-15 NOTE — PROGRESS NOTES
Subjective:   Patient ID: Jimmy Bain is a 46year old male. HPI  Carroll Anglin is a 46year old male who presents to the clinic for BCG instillation. This will be the patients 3rd of 6 instillations of BCG. History/Other:   Review of Systems  Current Outpatient Medications   Medication Sig Dispense Refill    OZEMPIC, 0.25 OR 0.5 MG/DOSE, 2 MG/3ML Subcutaneous Solution Pen-injector inject 0.25 mg subcutaneously weekly for 30 days and then inject 0.5 mg weekly Subcutaneously for 2 weeks. mometasone 0.1 % External Ointment Apply 1 Application topically daily. (Patient not taking: Reported on 8/18/2023) 1 each 0    metFORMIN 500 MG Oral Tab Take 1 tablet (500 mg total) by mouth 2 (two) times daily with meals. 60 tablet 0    chlorhexidine (HIBICLENS) 4 % External Liquid Apply 30 mL topically daily as needed. 946 mL 0    lisinopril 20 MG Oral Tab Take 1 tablet (20 mg total) by mouth daily. Multiple Vitamins-Minerals (CENTRUM MEN OR) Take 1 tablet by mouth daily. Allergies:No Known Allergies    Objective:   Physical Exam  Constitutional:       Appearance: Normal appearance. HENT:      Head: Normocephalic. Eyes:      General: No scleral icterus. Extraocular Movements: Extraocular movements intact. Pulmonary:      Effort: Pulmonary effort is normal. No respiratory distress. Neurological:      Mental Status: He is alert and oriented to person, place, and time. Psychiatric:         Mood and Affect: Mood normal.         Behavior: Behavior normal.         Assessment & Plan:   No diagnosis found. No orders of the defined types were placed in this encounter. Carroll Anglin is a 46year old male who presents to the clinic for BCG instillation. This is the 3rd of 6 instillation. Urine culture from 11/13/2023 was negative. He denies any symptoms to suggest a UTI. I explained that there is no contraindication to performing his BCG instillation today. Consent has been signed.   He confirms he has bleach available at home. 14 fr catheter was placed by RN using sterile technique. Return of clear yellow urine. BCG full strength (50 mg) was gently instilled into the bladder, patient tolerated well. Catheter was them removed. Supplies were disposed of using standard BCG protocol. Follow up in 1 week. Patient was agreeable.   Cysto scheduled for 1/3/2024       Meds This Visit:  Requested Prescriptions      No prescriptions requested or ordered in this encounter       Imaging & Referrals:  None    Edis Drummond PA-C  November 15, 2023

## 2023-11-15 NOTE — PROGRESS NOTES
Patient presents for instillation 3 of 6 BCG. Identified patient using his full name and . Positioned patient on the bed and he removed his clothing below the waist. Explained the procedure to the patient and he verbalized understanding. He had no questions. I draped the patient with a sterile drape, prepped the area in sterile fashion and donned sterile gloves. I prepped the tip of the penis first with Iodine let this dry and then injected Lidocaine 2% for numbing. Inserted the straight cath coude catheter without problems. Time out/bladder irrigation/catheter removal performed by ZURI Valencia. Products used disposed off in a biohazard container. Pt cleaned area with wipes and dressed himself.

## 2023-11-20 ENCOUNTER — LAB ENCOUNTER (OUTPATIENT)
Dept: LAB | Facility: HOSPITAL | Age: 53
End: 2023-11-20
Attending: UROLOGY
Payer: COMMERCIAL

## 2023-11-20 DIAGNOSIS — D49.4 BLADDER TUMOR: ICD-10-CM

## 2023-11-20 LAB
BILIRUB UR QL: NEGATIVE
CLARITY UR: CLEAR
GLUCOSE UR-MCNC: 300 MG/DL
HGB UR QL STRIP.AUTO: NEGATIVE
KETONES UR-MCNC: NEGATIVE MG/DL
LEUKOCYTE ESTERASE UR QL STRIP.AUTO: 250
NITRITE UR QL STRIP.AUTO: NEGATIVE
PH UR: 5 [PH] (ref 5–8)
PROT UR-MCNC: NEGATIVE MG/DL
SP GR UR STRIP: 1.02 (ref 1–1.03)
UROBILINOGEN UR STRIP-ACNC: NORMAL

## 2023-11-20 PROCEDURE — 81001 URINALYSIS AUTO W/SCOPE: CPT

## 2023-11-20 PROCEDURE — 87086 URINE CULTURE/COLONY COUNT: CPT

## 2023-11-21 ENCOUNTER — LAB ENCOUNTER (OUTPATIENT)
Dept: LAB | Facility: HOSPITAL | Age: 53
End: 2023-11-21
Attending: INTERNAL MEDICINE
Payer: COMMERCIAL

## 2023-11-21 DIAGNOSIS — R89.1 ABNORMAL LEVEL OF HORMONES IN SPECIMENS FROM OTH ORG/TISS: ICD-10-CM

## 2023-11-21 DIAGNOSIS — I10 HYPERTENSION, ESSENTIAL: Primary | ICD-10-CM

## 2023-11-21 DIAGNOSIS — E11.9 DIABETES MELLITUS (HCC): ICD-10-CM

## 2023-11-21 DIAGNOSIS — C68.9 MALIGNANT NEOPLASM OF URINARY ORGAN (HCC): ICD-10-CM

## 2023-11-21 DIAGNOSIS — Z12.9 SCREENING FOR CANCER: ICD-10-CM

## 2023-11-21 LAB
ALBUMIN SERPL-MCNC: 4.4 G/DL (ref 3.2–4.8)
ALBUMIN/GLOB SERPL: 1.6 {RATIO} (ref 1–2)
ALP LIVER SERPL-CCNC: 59 U/L
ALT SERPL-CCNC: 32 U/L
ANION GAP SERPL CALC-SCNC: 8 MMOL/L (ref 0–18)
AST SERPL-CCNC: 17 U/L (ref ?–34)
BASOPHILS # BLD AUTO: 0.05 X10(3) UL (ref 0–0.2)
BASOPHILS NFR BLD AUTO: 0.6 %
BILIRUB SERPL-MCNC: 0.3 MG/DL (ref 0.3–1.2)
BILIRUB UR QL: NEGATIVE
BUN BLD-MCNC: 13 MG/DL (ref 9–23)
BUN/CREAT SERPL: 14.9 (ref 10–20)
CALCIUM BLD-MCNC: 9.2 MG/DL (ref 8.7–10.4)
CHLORIDE SERPL-SCNC: 107 MMOL/L (ref 98–112)
CLARITY UR: CLEAR
CO2 SERPL-SCNC: 22 MMOL/L (ref 21–32)
COMPLEXED PSA SERPL-MCNC: 0.94 NG/ML (ref ?–4)
CREAT BLD-MCNC: 0.87 MG/DL
CREAT UR-SCNC: 133.3 MG/DL
DEPRECATED RDW RBC AUTO: 43.7 FL (ref 35.1–46.3)
EGFRCR SERPLBLD CKD-EPI 2021: 103 ML/MIN/1.73M2 (ref 60–?)
EOSINOPHIL # BLD AUTO: 0.15 X10(3) UL (ref 0–0.7)
EOSINOPHIL NFR BLD AUTO: 1.8 %
ERYTHROCYTE [DISTWIDTH] IN BLOOD BY AUTOMATED COUNT: 13.4 % (ref 11–15)
FASTING STATUS PATIENT QL REPORTED: NO
GLOBULIN PLAS-MCNC: 2.8 G/DL (ref 2.8–4.4)
GLUCOSE BLD-MCNC: 291 MG/DL (ref 70–99)
GLUCOSE UR-MCNC: 1000 MG/DL
HCT VFR BLD AUTO: 47.2 %
HGB BLD-MCNC: 15.6 G/DL
IMM GRANULOCYTES # BLD AUTO: 0.04 X10(3) UL (ref 0–1)
IMM GRANULOCYTES NFR BLD: 0.5 %
KETONES UR-MCNC: NEGATIVE MG/DL
LEUKOCYTE ESTERASE UR QL STRIP.AUTO: 250
LYMPHOCYTES # BLD AUTO: 2.39 X10(3) UL (ref 1–4)
LYMPHOCYTES NFR BLD AUTO: 29.3 %
MCH RBC QN AUTO: 29.3 PG (ref 26–34)
MCHC RBC AUTO-ENTMCNC: 33.1 G/DL (ref 31–37)
MCV RBC AUTO: 88.7 FL
MICROALBUMIN UR-MCNC: 4 MG/DL
MICROALBUMIN/CREAT 24H UR-RTO: 30 UG/MG (ref ?–30)
MONOCYTES # BLD AUTO: 0.68 X10(3) UL (ref 0.1–1)
MONOCYTES NFR BLD AUTO: 8.3 %
NEUTROPHILS # BLD AUTO: 4.86 X10 (3) UL (ref 1.5–7.7)
NEUTROPHILS # BLD AUTO: 4.86 X10(3) UL (ref 1.5–7.7)
NEUTROPHILS NFR BLD AUTO: 59.5 %
NITRITE UR QL STRIP.AUTO: NEGATIVE
OSMOLALITY SERPL CALC.SUM OF ELEC: 295 MOSM/KG (ref 275–295)
PH UR: 5.5 [PH] (ref 5–8)
PLATELET # BLD AUTO: 331 10(3)UL (ref 150–450)
POTASSIUM SERPL-SCNC: 4.1 MMOL/L (ref 3.5–5.1)
PROT SERPL-MCNC: 7.2 G/DL (ref 5.7–8.2)
RBC # BLD AUTO: 5.32 X10(6)UL
RBC #/AREA URNS AUTO: >10 /HPF
SODIUM SERPL-SCNC: 137 MMOL/L (ref 136–145)
SP GR UR STRIP: 1.03 (ref 1–1.03)
T4 FREE SERPL-MCNC: 0.9 NG/DL (ref 0.8–1.7)
TSI SER-ACNC: 1.86 MIU/ML (ref 0.55–4.78)
UROBILINOGEN UR STRIP-ACNC: NORMAL
WBC # BLD AUTO: 8.2 X10(3) UL (ref 4–11)

## 2023-11-21 PROCEDURE — 82043 UR ALBUMIN QUANTITATIVE: CPT

## 2023-11-21 PROCEDURE — 36415 COLL VENOUS BLD VENIPUNCTURE: CPT

## 2023-11-21 PROCEDURE — 82570 ASSAY OF URINE CREATININE: CPT

## 2023-11-21 PROCEDURE — 84443 ASSAY THYROID STIM HORMONE: CPT

## 2023-11-21 PROCEDURE — 85025 COMPLETE CBC W/AUTO DIFF WBC: CPT

## 2023-11-21 PROCEDURE — 84403 ASSAY OF TOTAL TESTOSTERONE: CPT

## 2023-11-21 PROCEDURE — 84402 ASSAY OF FREE TESTOSTERONE: CPT

## 2023-11-21 PROCEDURE — 80053 COMPREHEN METABOLIC PANEL: CPT

## 2023-11-21 PROCEDURE — 84439 ASSAY OF FREE THYROXINE: CPT

## 2023-11-21 PROCEDURE — 81001 URINALYSIS AUTO W/SCOPE: CPT

## 2023-11-22 ENCOUNTER — PROCEDURE (OUTPATIENT)
Dept: SURGERY | Facility: CLINIC | Age: 53
End: 2023-11-22
Payer: COMMERCIAL

## 2023-11-22 VITALS — DIASTOLIC BLOOD PRESSURE: 89 MMHG | HEART RATE: 88 BPM | SYSTOLIC BLOOD PRESSURE: 133 MMHG

## 2023-11-22 DIAGNOSIS — D49.4 BLADDER TUMOR: Primary | ICD-10-CM

## 2023-11-22 PROCEDURE — 3079F DIAST BP 80-89 MM HG: CPT | Performed by: PHYSICIAN ASSISTANT

## 2023-11-22 PROCEDURE — 51720 TREATMENT OF BLADDER LESION: CPT | Performed by: PHYSICIAN ASSISTANT

## 2023-11-22 PROCEDURE — 3075F SYST BP GE 130 - 139MM HG: CPT | Performed by: PHYSICIAN ASSISTANT

## 2023-11-22 NOTE — PROGRESS NOTES
-Pt presents for #4/6 BCG Bladder installation; identity verified with name & .  -Positions self on exam table; draped for privacy to lower jeans/ underwear; prep penis w/ betadine; instill thru urethra 2% lido-gel 2-3 min for pain management; 16Fr coude straight catheter inserted; Time-out/ installation/ catheter removal per MARIANNA Maldonado PAC; pt redressed self. -Encounter complete.

## 2023-11-22 NOTE — PROGRESS NOTES
Subjective:   Patient ID: Lam Marsh is a 46year old male. HPI  Giacomo Moise is a 46year old male who presents to the clinic for BCG instillation. This will be the patients 4th of 6 instillations of BCG. History/Other:   Review of Systems  Current Outpatient Medications   Medication Sig Dispense Refill    OZEMPIC, 0.25 OR 0.5 MG/DOSE, 2 MG/3ML Subcutaneous Solution Pen-injector inject 0.25 mg subcutaneously weekly for 30 days and then inject 0.5 mg weekly Subcutaneously for 2 weeks. mometasone 0.1 % External Ointment Apply 1 Application topically daily. (Patient not taking: Reported on 8/18/2023) 1 each 0    metFORMIN 500 MG Oral Tab Take 1 tablet (500 mg total) by mouth 2 (two) times daily with meals. 60 tablet 0    chlorhexidine (HIBICLENS) 4 % External Liquid Apply 30 mL topically daily as needed. 946 mL 0    lisinopril 20 MG Oral Tab Take 1 tablet (20 mg total) by mouth daily. Multiple Vitamins-Minerals (CENTRUM MEN OR) Take 1 tablet by mouth daily. Allergies:No Known Allergies    Objective:   Physical Exam  Constitutional:       Appearance: Normal appearance. HENT:      Head: Normocephalic. Eyes:      General: No scleral icterus. Extraocular Movements: Extraocular movements intact. Pulmonary:      Effort: Pulmonary effort is normal. No respiratory distress. Neurological:      Mental Status: He is alert and oriented to person, place, and time. Psychiatric:         Mood and Affect: Mood normal.         Behavior: Behavior normal.         Assessment & Plan:   1. Bladder tumor        No orders of the defined types were placed in this encounter. Giacomo Moise is a 46year old male who presents to the clinic for BCG (full dose 50mg) instillation. This is the 4th of 6 instillation. Urine culture from 11/20/2023 no growth. UA from 11/21/2023 250 leukocytes. He denies any symptoms to suggest a UTI.        I explained that there is no contraindication to performing his BCG instillation today. Consent has been signed. He confirms he has bleach available at home. 14 fr catheter was placed by RN using sterile technique. Return of clear yellow urine. BCG full strength (50 mg) was gently instilled into the bladder, patient tolerated well. Catheter was them removed. Supplies were disposed of using standard BCG protocol. Follow up in 1 week. Patient was agreeable.   Cysto scheduled for 1/3/2024       Meds This Visit:  Requested Prescriptions      No prescriptions requested or ordered in this encounter       Imaging & Referrals:  None    Jeremy Barcenas PA-C  November 15, 2023

## 2023-11-27 ENCOUNTER — LAB ENCOUNTER (OUTPATIENT)
Dept: LAB | Facility: HOSPITAL | Age: 53
End: 2023-11-27
Attending: UROLOGY
Payer: COMMERCIAL

## 2023-11-27 DIAGNOSIS — D49.4 BLADDER TUMOR: ICD-10-CM

## 2023-11-27 LAB
BILIRUB UR QL: NEGATIVE
CLARITY UR: CLEAR
FREE TESTOST DIRECT: 3.1 PG/ML
GLUCOSE UR-MCNC: 50 MG/DL
KETONES UR-MCNC: NEGATIVE MG/DL
LEUKOCYTE ESTERASE UR QL STRIP.AUTO: 75
NITRITE UR QL STRIP.AUTO: NEGATIVE
PH UR: 5 [PH] (ref 5–8)
PROT UR-MCNC: NEGATIVE MG/DL
RBC #/AREA URNS AUTO: >10 /HPF
SP GR UR STRIP: 1.02 (ref 1–1.03)
TESTOSTERONE: 58 NG/DL
UROBILINOGEN UR STRIP-ACNC: NORMAL

## 2023-11-27 PROCEDURE — 81001 URINALYSIS AUTO W/SCOPE: CPT

## 2023-11-27 PROCEDURE — 87086 URINE CULTURE/COLONY COUNT: CPT

## 2023-11-29 ENCOUNTER — PROCEDURE (OUTPATIENT)
Dept: SURGERY | Facility: CLINIC | Age: 53
End: 2023-11-29
Payer: COMMERCIAL

## 2023-11-29 VITALS — RESPIRATION RATE: 16 BRPM | HEART RATE: 86 BPM | DIASTOLIC BLOOD PRESSURE: 76 MMHG | SYSTOLIC BLOOD PRESSURE: 133 MMHG

## 2023-11-29 DIAGNOSIS — C67.9 MALIGNANT NEOPLASM OF URINARY BLADDER, UNSPECIFIED SITE (HCC): Primary | ICD-10-CM

## 2023-11-29 NOTE — PROGRESS NOTES
I determined that pt's urine results were acceptable and that PA was in place so I took the BCG 50 mg for pt's # 5 of 6 bladder instillation to the room and I called pt in from the waiting area. I introduced myself to the pt and I verified the spelling of his last name and verified his . I explained to the pt that I would be placing his catheter for his BCG TX. I then asked the pt to step up onto the exam table and lower his bottom clothing to his ankles. I then placed the pt in supine position on the exam table and proceeded to prep and drape the pt in sterile fashion and I proceeded to instill about 2/3 of the Urojet (Lidocaine HCL 2 %- 10 ml ) into the pt's urethra and I then constricted the head of the penis for about 1 min to allow sufficient numbing. I then proceeded to insert a 14 Fr coude tipped catheter into the bladder and received about 50 ml of yellow colored urine. I went out of the room to get AKD and we returned to the room together and we then conducted the timeout and then AKD instilled the BCG 50 mg without incident and pt tolerated it well. AKD then removed the catheter and all of the chemo waste was disposed of into the yellow chemo bag. I gave pt wipes to clean up and told him to redress and that I would return in a minute. I disposed of the chemo bag into the yellow chemo bucket in the dirty utility room. I went back to the room where the pt was and reviewed the BCG aftercare instructions.  I asked pt to return next week for his last [id number] and I  sent the pt on his way,  I then cleaned the exam room with the orange topped bleach wipes as per protocol

## 2023-11-29 NOTE — PROGRESS NOTES
Subjective:   Patient ID: Jaycee Felix is a 46year old male. MARY CARMEN Reyna is a 46year old male who presents to the clinic for BCG instillation. This will be the patients 5th of 6 instillations of BCG. History/Other:   Review of Systems  Current Outpatient Medications   Medication Sig Dispense Refill    OZEMPIC, 0.25 OR 0.5 MG/DOSE, 2 MG/3ML Subcutaneous Solution Pen-injector inject 0.25 mg subcutaneously weekly for 30 days and then inject 0.5 mg weekly Subcutaneously for 2 weeks. mometasone 0.1 % External Ointment Apply 1 Application topically daily. (Patient not taking: Reported on 8/18/2023) 1 each 0    metFORMIN 500 MG Oral Tab Take 1 tablet (500 mg total) by mouth 2 (two) times daily with meals. 60 tablet 0    chlorhexidine (HIBICLENS) 4 % External Liquid Apply 30 mL topically daily as needed. 946 mL 0    lisinopril 20 MG Oral Tab Take 1 tablet (20 mg total) by mouth daily. Multiple Vitamins-Minerals (CENTRUM MEN OR) Take 1 tablet by mouth daily. Allergies:No Known Allergies    Objective:   Physical Exam  Constitutional:       Appearance: Normal appearance. HENT:      Head: Normocephalic. Eyes:      General: No scleral icterus. Extraocular Movements: Extraocular movements intact. Pulmonary:      Effort: Pulmonary effort is normal. No respiratory distress. Neurological:      Mental Status: He is alert and oriented to person, place, and time. Psychiatric:         Mood and Affect: Mood normal.         Behavior: Behavior normal.         Assessment & Plan:   1. Malignant neoplasm of urinary bladder, unspecified site ([facility] [address]        No orders of the defined types were placed in this encounter. Victorino Reyna is a 46year old male who presents to the clinic for BCG (full dose 50mg) instillation. This is the 5th of 6 instillation. Urine culture from 11/27/2023 no growth. He denies any symptoms to suggest a UTI.        I explained that there is no contraindication to performing his BCG instillation today. Consent has been signed. He confirms he has bleach available at home. 14 fr catheter was placed by RN using sterile technique. Return of clear yellow urine. BCG full strength (50 mg) was gently instilled into the bladder, patient tolerated well. Catheter was them removed. Supplies were disposed of using standard BCG protocol. Follow up in 1 week. Patient was agreeable.   Cysto scheduled for 1/3/2024       Meds This Visit:  Requested Prescriptions      No prescriptions requested or ordered in this encounter       Imaging & Referrals:  None    Marla Mcgrath  November 29, 2023

## 2023-12-01 NOTE — TELEPHONE ENCOUNTER
From: Chau Freeman  To: Fernanda Burr  Sent: 11/9/2023 4:15 AM CST  Subject: Stones    I forgot to tell you I have been passing more than normal amount of kidney stones. I know my urine culture came back abnormal but my wife wanted to see about getting blood work done to see if something else is going on.

## 2023-12-04 ENCOUNTER — LAB ENCOUNTER (OUTPATIENT)
Dept: LAB | Facility: HOSPITAL | Age: 53
End: 2023-12-04
Attending: UROLOGY
Payer: COMMERCIAL

## 2023-12-04 DIAGNOSIS — D49.4 BLADDER TUMOR: ICD-10-CM

## 2023-12-04 LAB
BILIRUB UR QL: NEGATIVE
CLARITY UR: CLEAR
GLUCOSE UR-MCNC: NORMAL MG/DL
KETONES UR-MCNC: NEGATIVE MG/DL
LEUKOCYTE ESTERASE UR QL STRIP.AUTO: 250
NITRITE UR QL STRIP.AUTO: NEGATIVE
PH UR: 5 [PH] (ref 5–8)
PROT UR-MCNC: NEGATIVE MG/DL
RBC #/AREA URNS AUTO: >10 /HPF
SP GR UR STRIP: 1.02 (ref 1–1.03)
UROBILINOGEN UR STRIP-ACNC: NORMAL

## 2023-12-04 PROCEDURE — 87086 URINE CULTURE/COLONY COUNT: CPT

## 2023-12-04 PROCEDURE — 81001 URINALYSIS AUTO W/SCOPE: CPT

## 2023-12-06 ENCOUNTER — PROCEDURE (OUTPATIENT)
Dept: SURGERY | Facility: CLINIC | Age: 53
End: 2023-12-06
Payer: COMMERCIAL

## 2023-12-06 VITALS — SYSTOLIC BLOOD PRESSURE: 121 MMHG | DIASTOLIC BLOOD PRESSURE: 85 MMHG | HEART RATE: 81 BPM

## 2023-12-06 PROCEDURE — 3074F SYST BP LT 130 MM HG: CPT | Performed by: UROLOGY

## 2023-12-06 PROCEDURE — 3079F DIAST BP 80-89 MM HG: CPT | Performed by: UROLOGY

## 2023-12-06 NOTE — PROGRESS NOTES
Subjective:   Patient ID: Chaim Ferrari is a 46year old male. HPI  Elliott Sanders is a 46year old male who presents to the clinic for BCG instillation. This will be the patients 6 of 6 instillations of BCG. History/Other:   Review of Systems  Current Outpatient Medications   Medication Sig Dispense Refill    OZEMPIC, 0.25 OR 0.5 MG/DOSE, 2 MG/3ML Subcutaneous Solution Pen-injector inject 0.25 mg subcutaneously weekly for 30 days and then inject 0.5 mg weekly Subcutaneously for 2 weeks. mometasone 0.1 % External Ointment Apply 1 Application topically daily. (Patient not taking: Reported on 8/18/2023) 1 each 0    metFORMIN 500 MG Oral Tab Take 1 tablet (500 mg total) by mouth 2 (two) times daily with meals. 60 tablet 0    chlorhexidine (HIBICLENS) 4 % External Liquid Apply 30 mL topically daily as needed. 946 mL 0    lisinopril 20 MG Oral Tab Take 1 tablet (20 mg total) by mouth daily. Multiple Vitamins-Minerals (CENTRUM MEN OR) Take 1 tablet by mouth daily. Allergies:No Known Allergies    Objective:   Physical Exam  Constitutional:       Appearance: Normal appearance. HENT:      Head: Normocephalic. Eyes:      General: No scleral icterus. Extraocular Movements: Extraocular movements intact. Pulmonary:      Effort: Pulmonary effort is normal. No respiratory distress. Neurological:      Mental Status: He is alert and oriented to person, place, and time. Psychiatric:         Mood and Affect: Mood normal.         Behavior: Behavior normal.         Assessment & Plan:   No diagnosis found. No orders of the defined types were placed in this encounter. Elliott Sanders is a 46year old male who presents to the clinic for BCG (full dose 50mg) instillation. This is the 6th of 6 instillation. Urine culture from 12/04/2023 no growth. He denies any symptoms to suggest a UTI. I explained that there is no contraindication to performing his BCG instillation today.   Consent has been signed. He confirms he has bleach available at home. 14 fr catheter was placed by RN using sterile technique. Return of clear yellow urine. BCG full strength (50 mg) was gently instilled into the bladder, patient tolerated well. Catheter was them removed. Supplies were disposed of using standard BCG protocol.        Cysto scheduled for 1/3/2024       Meds This Visit:  Requested Prescriptions      No prescriptions requested or ordered in this encounter       Imaging & Referrals:  None    Cindy Roberto PA-C  December 06, 2023

## 2023-12-08 ENCOUNTER — LAB ENCOUNTER (OUTPATIENT)
Dept: LAB | Facility: HOSPITAL | Age: 53
End: 2023-12-08
Attending: INTERNAL MEDICINE
Payer: COMMERCIAL

## 2023-12-08 DIAGNOSIS — R89.1 ABNORMAL LEVEL OF HORMONES IN SPECIMENS FROM OTH ORG/TISS: ICD-10-CM

## 2023-12-08 DIAGNOSIS — I10 ESSENTIAL HYPERTENSION, MALIGNANT: ICD-10-CM

## 2023-12-08 DIAGNOSIS — E11.9 DIABETES MELLITUS (HCC): Primary | ICD-10-CM

## 2023-12-08 DIAGNOSIS — E55.9 VITAMIN D DEFICIENCY: ICD-10-CM

## 2023-12-08 LAB
ALBUMIN SERPL-MCNC: 4.5 G/DL (ref 3.2–4.8)
ALBUMIN/GLOB SERPL: 1.7 {RATIO} (ref 1–2)
ALP LIVER SERPL-CCNC: 52 U/L
ALT SERPL-CCNC: 32 U/L
ANION GAP SERPL CALC-SCNC: 7 MMOL/L (ref 0–18)
AST SERPL-CCNC: 18 U/L (ref ?–34)
BASOPHILS # BLD AUTO: 0.04 X10(3) UL (ref 0–0.2)
BASOPHILS NFR BLD AUTO: 0.4 %
BILIRUB SERPL-MCNC: 0.4 MG/DL (ref 0.3–1.2)
BILIRUB UR QL: NEGATIVE
BUN BLD-MCNC: 14 MG/DL (ref 9–23)
BUN/CREAT SERPL: 15.2 (ref 10–20)
CALCIUM BLD-MCNC: 9.8 MG/DL (ref 8.7–10.4)
CHLORIDE SERPL-SCNC: 103 MMOL/L (ref 98–112)
CHOLEST SERPL-MCNC: 129 MG/DL (ref ?–200)
CLARITY UR: CLEAR
CO2 SERPL-SCNC: 26 MMOL/L (ref 21–32)
CREAT BLD-MCNC: 0.92 MG/DL
CREAT UR-SCNC: 155.7 MG/DL
DEPRECATED RDW RBC AUTO: 42.4 FL (ref 35.1–46.3)
EGFRCR SERPLBLD CKD-EPI 2021: 99 ML/MIN/1.73M2 (ref 60–?)
EOSINOPHIL # BLD AUTO: 0.16 X10(3) UL (ref 0–0.7)
EOSINOPHIL NFR BLD AUTO: 1.7 %
ERYTHROCYTE [DISTWIDTH] IN BLOOD BY AUTOMATED COUNT: 13.3 % (ref 11–15)
EST. AVERAGE GLUCOSE BLD GHB EST-MCNC: 174 MG/DL (ref 68–126)
FASTING PATIENT LIPID ANSWER: NO
FASTING STATUS PATIENT QL REPORTED: NO
GLOBULIN PLAS-MCNC: 2.7 G/DL (ref 2.8–4.4)
GLUCOSE BLD-MCNC: 97 MG/DL (ref 70–99)
GLUCOSE UR-MCNC: NORMAL MG/DL
HBA1C MFR BLD: 7.7 % (ref ?–5.7)
HCT VFR BLD AUTO: 44.1 %
HDLC SERPL-MCNC: 33 MG/DL (ref 40–59)
HGB BLD-MCNC: 15.1 G/DL
HGB UR QL STRIP.AUTO: NEGATIVE
IMM GRANULOCYTES # BLD AUTO: 0.04 X10(3) UL (ref 0–1)
IMM GRANULOCYTES NFR BLD: 0.4 %
KETONES UR-MCNC: NEGATIVE MG/DL
LDLC SERPL CALC-MCNC: 66 MG/DL (ref ?–100)
LEUKOCYTE ESTERASE UR QL STRIP.AUTO: 250
LYMPHOCYTES # BLD AUTO: 3.54 X10(3) UL (ref 1–4)
LYMPHOCYTES NFR BLD AUTO: 37.9 %
MCH RBC QN AUTO: 29.8 PG (ref 26–34)
MCHC RBC AUTO-ENTMCNC: 34.2 G/DL (ref 31–37)
MCV RBC AUTO: 87.2 FL
MICROALBUMIN UR-MCNC: 2.4 MG/DL
MICROALBUMIN/CREAT 24H UR-RTO: 15.4 UG/MG (ref ?–30)
MONOCYTES # BLD AUTO: 0.82 X10(3) UL (ref 0.1–1)
MONOCYTES NFR BLD AUTO: 8.8 %
NEUTROPHILS # BLD AUTO: 4.75 X10 (3) UL (ref 1.5–7.7)
NEUTROPHILS # BLD AUTO: 4.75 X10(3) UL (ref 1.5–7.7)
NEUTROPHILS NFR BLD AUTO: 50.8 %
NITRITE UR QL STRIP.AUTO: NEGATIVE
NONHDLC SERPL-MCNC: 96 MG/DL (ref ?–130)
OSMOLALITY SERPL CALC.SUM OF ELEC: 282 MOSM/KG (ref 275–295)
PH UR: 5.5 [PH] (ref 5–8)
PLATELET # BLD AUTO: 307 10(3)UL (ref 150–450)
POTASSIUM SERPL-SCNC: 4 MMOL/L (ref 3.5–5.1)
PROT SERPL-MCNC: 7.2 G/DL (ref 5.7–8.2)
PROT UR-MCNC: NEGATIVE MG/DL
RBC # BLD AUTO: 5.06 X10(6)UL
SODIUM SERPL-SCNC: 136 MMOL/L (ref 136–145)
SP GR UR STRIP: 1.02 (ref 1–1.03)
T4 SERPL-MCNC: 6 UG/DL
TRIGL SERPL-MCNC: 177 MG/DL (ref 30–149)
TSI SER-ACNC: 1.69 MIU/ML (ref 0.55–4.78)
UROBILINOGEN UR STRIP-ACNC: NORMAL
VIT D+METAB SERPL-MCNC: 32.7 NG/ML (ref 30–100)
VLDLC SERPL CALC-MCNC: 27 MG/DL (ref 0–30)
WBC # BLD AUTO: 9.4 X10(3) UL (ref 4–11)

## 2023-12-08 PROCEDURE — 85025 COMPLETE CBC W/AUTO DIFF WBC: CPT

## 2023-12-08 PROCEDURE — 80061 LIPID PANEL: CPT

## 2023-12-08 PROCEDURE — 84436 ASSAY OF TOTAL THYROXINE: CPT

## 2023-12-08 PROCEDURE — 82306 VITAMIN D 25 HYDROXY: CPT

## 2023-12-08 PROCEDURE — 81001 URINALYSIS AUTO W/SCOPE: CPT

## 2023-12-08 PROCEDURE — 84402 ASSAY OF FREE TESTOSTERONE: CPT

## 2023-12-08 PROCEDURE — 36415 COLL VENOUS BLD VENIPUNCTURE: CPT

## 2023-12-08 PROCEDURE — 84443 ASSAY THYROID STIM HORMONE: CPT

## 2023-12-08 PROCEDURE — 80053 COMPREHEN METABOLIC PANEL: CPT

## 2023-12-08 PROCEDURE — 82570 ASSAY OF URINE CREATININE: CPT

## 2023-12-08 PROCEDURE — 84403 ASSAY OF TOTAL TESTOSTERONE: CPT

## 2023-12-08 PROCEDURE — 83036 HEMOGLOBIN GLYCOSYLATED A1C: CPT

## 2023-12-08 PROCEDURE — 82043 UR ALBUMIN QUANTITATIVE: CPT

## 2023-12-12 ENCOUNTER — OFFICE VISIT (OUTPATIENT)
Dept: FAMILY MEDICINE CLINIC | Facility: CLINIC | Age: 53
End: 2023-12-12
Payer: COMMERCIAL

## 2023-12-12 VITALS
BODY MASS INDEX: 40.18 KG/M2 | DIASTOLIC BLOOD PRESSURE: 76 MMHG | HEART RATE: 91 BPM | HEIGHT: 67 IN | RESPIRATION RATE: 16 BRPM | TEMPERATURE: 98 F | OXYGEN SATURATION: 97 % | SYSTOLIC BLOOD PRESSURE: 112 MMHG | WEIGHT: 256 LBS

## 2023-12-12 DIAGNOSIS — R05.1 ACUTE COUGH: Primary | ICD-10-CM

## 2023-12-12 PROCEDURE — 99213 OFFICE O/P EST LOW 20 MIN: CPT | Performed by: PHYSICIAN ASSISTANT

## 2023-12-12 PROCEDURE — 87635 SARS-COV-2 COVID-19 AMP PRB: CPT | Performed by: PHYSICIAN ASSISTANT

## 2023-12-13 ENCOUNTER — APPOINTMENT (OUTPATIENT)
Dept: GENERAL RADIOLOGY | Facility: HOSPITAL | Age: 53
End: 2023-12-13
Payer: COMMERCIAL

## 2023-12-13 ENCOUNTER — HOSPITAL ENCOUNTER (EMERGENCY)
Facility: HOSPITAL | Age: 53
Discharge: HOME | End: 2023-12-13
Attending: EMERGENCY MEDICINE
Payer: COMMERCIAL

## 2023-12-13 VITALS
DIASTOLIC BLOOD PRESSURE: 61 MMHG | RESPIRATION RATE: 22 BRPM | SYSTOLIC BLOOD PRESSURE: 106 MMHG | HEIGHT: 67 IN | HEART RATE: 108 BPM | WEIGHT: 250 LBS | BODY MASS INDEX: 39.24 KG/M2 | OXYGEN SATURATION: 92 % | TEMPERATURE: 100 F

## 2023-12-13 DIAGNOSIS — J11.1 INFLUENZA: Primary | ICD-10-CM

## 2023-12-13 LAB
ALBUMIN SERPL-MCNC: 3.8 G/DL (ref 3.4–5)
ALBUMIN/GLOB SERPL: 1.1 {RATIO} (ref 1–2)
ALP LIVER SERPL-CCNC: 50 U/L
ALT SERPL-CCNC: 42 U/L
ANION GAP SERPL CALC-SCNC: 9 MMOL/L (ref 0–18)
AST SERPL-CCNC: 16 U/L (ref 15–37)
BASOPHILS # BLD AUTO: 0.03 X10(3) UL (ref 0–0.2)
BASOPHILS NFR BLD AUTO: 0.4 %
BILIRUB SERPL-MCNC: 0.5 MG/DL (ref 0.1–2)
BUN BLD-MCNC: 16 MG/DL (ref 9–23)
CALCIUM BLD-MCNC: 9 MG/DL (ref 8.5–10.1)
CHLORIDE SERPL-SCNC: 105 MMOL/L (ref 98–112)
CO2 SERPL-SCNC: 22 MMOL/L (ref 21–32)
CREAT BLD-MCNC: 1.28 MG/DL
EGFRCR SERPLBLD CKD-EPI 2021: 67 ML/MIN/1.73M2 (ref 60–?)
EOSINOPHIL # BLD AUTO: 0 X10(3) UL (ref 0–0.7)
EOSINOPHIL NFR BLD AUTO: 0 %
ERYTHROCYTE [DISTWIDTH] IN BLOOD BY AUTOMATED COUNT: 13.5 %
FLUAV + FLUBV RNA SPEC NAA+PROBE: NEGATIVE
FLUAV + FLUBV RNA SPEC NAA+PROBE: POSITIVE
FREE TESTOST DIRECT: 4.1 PG/ML
GLOBULIN PLAS-MCNC: 3.6 G/DL (ref 2.8–4.4)
GLUCOSE BLD-MCNC: 147 MG/DL (ref 70–99)
HCT VFR BLD AUTO: 42.2 %
HGB BLD-MCNC: 14.8 G/DL
IMM GRANULOCYTES # BLD AUTO: 0.03 X10(3) UL (ref 0–1)
IMM GRANULOCYTES NFR BLD: 0.4 %
LYMPHOCYTES # BLD AUTO: 0.41 X10(3) UL (ref 1–4)
LYMPHOCYTES NFR BLD AUTO: 5.2 %
MCH RBC QN AUTO: 30.2 PG (ref 26–34)
MCHC RBC AUTO-ENTMCNC: 35.1 G/DL (ref 31–37)
MCV RBC AUTO: 86.1 FL
MONOCYTES # BLD AUTO: 0.61 X10(3) UL (ref 0.1–1)
MONOCYTES NFR BLD AUTO: 7.8 %
NEUTROPHILS # BLD AUTO: 6.78 X10 (3) UL (ref 1.5–7.7)
NEUTROPHILS # BLD AUTO: 6.78 X10(3) UL (ref 1.5–7.7)
NEUTROPHILS NFR BLD AUTO: 86.2 %
NT-PROBNP SERPL-MCNC: 72 PG/ML (ref ?–125)
OSMOLALITY SERPL CALC.SUM OF ELEC: 286 MOSM/KG (ref 275–295)
PLATELET # BLD AUTO: 250 10(3)UL (ref 150–450)
POTASSIUM SERPL-SCNC: 3.8 MMOL/L (ref 3.5–5.1)
PROT SERPL-MCNC: 7.4 G/DL (ref 6.4–8.2)
RBC # BLD AUTO: 4.9 X10(6)UL
RSV RNA SPEC NAA+PROBE: NEGATIVE
SARS-COV-2 RNA RESP QL NAA+PROBE: NOT DETECTED
SARS-COV-2 RNA RESP QL NAA+PROBE: NOT DETECTED
SODIUM SERPL-SCNC: 136 MMOL/L (ref 136–145)
TESTOSTERONE: 69 NG/DL
WBC # BLD AUTO: 7.9 X10(3) UL (ref 4–11)

## 2023-12-13 PROCEDURE — 99284 EMERGENCY DEPT VISIT MOD MDM: CPT

## 2023-12-13 PROCEDURE — 71046 X-RAY EXAM CHEST 2 VIEWS: CPT

## 2023-12-13 PROCEDURE — 80053 COMPREHEN METABOLIC PANEL: CPT | Performed by: EMERGENCY MEDICINE

## 2023-12-13 PROCEDURE — 0241U SARS-COV-2/FLU A AND B/RSV BY PCR (GENEXPERT): CPT

## 2023-12-13 PROCEDURE — 0241U SARS-COV-2/FLU A AND B/RSV BY PCR (GENEXPERT): CPT | Performed by: EMERGENCY MEDICINE

## 2023-12-13 PROCEDURE — 85025 COMPLETE CBC W/AUTO DIFF WBC: CPT | Performed by: EMERGENCY MEDICINE

## 2023-12-13 PROCEDURE — 96360 HYDRATION IV INFUSION INIT: CPT

## 2023-12-13 PROCEDURE — 83880 ASSAY OF NATRIURETIC PEPTIDE: CPT | Performed by: EMERGENCY MEDICINE

## 2023-12-13 RX ORDER — OSELTAMIVIR PHOSPHATE 75 MG/1
75 CAPSULE ORAL ONCE
Status: COMPLETED | OUTPATIENT
Start: 2023-12-13 | End: 2023-12-13

## 2023-12-13 RX ORDER — ACETAMINOPHEN 500 MG
1000 TABLET ORAL ONCE
Status: COMPLETED | OUTPATIENT
Start: 2023-12-13 | End: 2023-12-13

## 2023-12-13 RX ORDER — IBUPROFEN 600 MG/1
600 TABLET ORAL ONCE
Status: COMPLETED | OUTPATIENT
Start: 2023-12-13 | End: 2023-12-13

## 2023-12-13 RX ORDER — OSELTAMIVIR PHOSPHATE 75 MG/1
75 CAPSULE ORAL 2 TIMES DAILY
Qty: 10 CAPSULE | Refills: 0 | Status: SHIPPED | OUTPATIENT
Start: 2023-12-13 | End: 2023-12-18

## 2023-12-13 NOTE — ED INITIAL ASSESSMENT (HPI)
Patient reports cough, feels lightheaded and dehydrated, fever x 3 days. No meds taken since 9 PM last night. Seen at [facility] yesterday and had COVID test but results not back yet.

## 2023-12-13 NOTE — ED QUICK NOTES
Pt reevaluated by er physician pt informed of his test report and plan of care.  Verbalizing understanding

## 2024-01-09 ENCOUNTER — TELEPHONE (OUTPATIENT)
Dept: SURGERY | Facility: CLINIC | Age: 54
End: 2024-01-09

## 2024-01-09 ENCOUNTER — PROCEDURE (OUTPATIENT)
Dept: SURGERY | Facility: CLINIC | Age: 54
End: 2024-01-09
Payer: COMMERCIAL

## 2024-01-09 VITALS — DIASTOLIC BLOOD PRESSURE: 100 MMHG | RESPIRATION RATE: 16 BRPM | SYSTOLIC BLOOD PRESSURE: 153 MMHG | HEART RATE: 109 BPM

## 2024-01-09 DIAGNOSIS — C67.9 MALIGNANT NEOPLASM OF URINARY BLADDER, UNSPECIFIED SITE (HCC): Primary | ICD-10-CM

## 2024-01-09 PROCEDURE — 3080F DIAST BP >= 90 MM HG: CPT | Performed by: UROLOGY

## 2024-01-09 PROCEDURE — 52000 CYSTOURETHROSCOPY: CPT | Performed by: UROLOGY

## 2024-01-09 PROCEDURE — 3077F SYST BP >= 140 MM HG: CPT | Performed by: UROLOGY

## 2024-01-09 RX ORDER — TESTOSTERONE CYPIONATE 200 MG/ML
VIAL (ML) INTRAMUSCULAR
COMMUNITY

## 2024-01-09 NOTE — PROCEDURES
CYSTOSCOPY (MALE)    PRE-OP DIAGNOSIS: HgT1/CIS    POST-OP DIAGNOSIS: same    PROCEDURE: Cystsocopy    SURGEON: Maggie Alvarado MD    ASSISTANT: none     EBL: minimal    FINDINGS:   Urethra: No meatal stenosis, no anterior or posterior urethral strictures, open bladder neck   Prostate: Bilobar coaptation with mild intravesical component   Bladder: Bilateral ureteral orifices in orthotopic position with efflux, no suspicious or concerning erythematous lesions, no papillary bladder tumors, no stones   Retroflexion: Mild intravesical component of prostate   Other findings: none    INDICATIONS: HgT1 in 2023. Repeat TUR with HgTa, T1 and CIS. Offered cystectomy, declined. Underwent BCG. First surveillance cystoscopy with HgTa recurrence. He underwent re-induction BCG. Presents for cysto.    PROCEDURE: Patient was brought to the procedure suite and a timeout was performed identifying the patient,  and procedure being performed.  The risks of the procedure were once again detailed to the patient including bleeding, infection, dysuria.  The patient agreed to proceed.  The patient had a negative urinalysis.  No antibiotics were given to patient prior to this procedure.     He was placed in a supine position on the table and a flexible cystoscope was inserted per urethra.  There were no obvious urethral strictures in the anterior, membranous or posterior urethra.  The prostate appeared enlarged.  Once in the bladder we performed a full diagnostic/surveillance cystoscopy which demonstrated a scar but no recurrence of tumors. I did see two areas that looked slightly red and raised, but not concerning.  On retroflexion we noted no abnormalities.  The scope was then carefully removed and once again no urethral abnormalities were noted.    There were no complications after this procedure and the patient tolerated the procedure without issue.    IMPRESSION: cysto with a few red raised areas that may be inflammatory, will  continue to monitor. No obvious recurrences. Will start maintenance in February 2024. Cytology today. Cytology and cystoscopy in 3 months.    PLAN:    Cytology today  Cytology and cystoscopy in 3 months  Bcg in Feb 2024 (2/1/2024)

## 2024-01-10 LAB — NON GYNE INTERPRETATION: NEGATIVE

## 2024-02-05 ENCOUNTER — TELEPHONE (OUTPATIENT)
Dept: SURGERY | Facility: CLINIC | Age: 54
End: 2024-02-05

## 2024-02-05 ENCOUNTER — LAB ENCOUNTER (OUTPATIENT)
Dept: LAB | Facility: HOSPITAL | Age: 54
End: 2024-02-05
Attending: UROLOGY
Payer: COMMERCIAL

## 2024-02-05 DIAGNOSIS — C67.9 MALIGNANT NEOPLASM OF URINARY BLADDER, UNSPECIFIED SITE (HCC): Primary | ICD-10-CM

## 2024-02-05 DIAGNOSIS — C67.9 MALIGNANT NEOPLASM OF URINARY BLADDER, UNSPECIFIED SITE (HCC): ICD-10-CM

## 2024-02-05 LAB
BILIRUB UR QL: NEGATIVE
CLARITY UR: CLEAR
GLUCOSE UR-MCNC: NORMAL MG/DL
HGB UR QL STRIP.AUTO: NEGATIVE
KETONES UR-MCNC: NEGATIVE MG/DL
LEUKOCYTE ESTERASE UR QL STRIP.AUTO: 25
NITRITE UR QL STRIP.AUTO: NEGATIVE
NON GYNE INTERPRETATION: NEGATIVE
PH UR: 5.5 [PH] (ref 5–8)
PROT UR-MCNC: NEGATIVE MG/DL
SP GR UR STRIP: 1.02 (ref 1–1.03)
UROBILINOGEN UR STRIP-ACNC: NORMAL

## 2024-02-05 PROCEDURE — 81001 URINALYSIS AUTO W/SCOPE: CPT

## 2024-02-05 PROCEDURE — 88108 CYTOPATH CONCENTRATE TECH: CPT

## 2024-02-05 PROCEDURE — 87086 URINE CULTURE/COLONY COUNT: CPT

## 2024-02-05 NOTE — TELEPHONE ENCOUNTER
Genesis from [facility] lab calling requesting order for urine culture and urinalysis.Please advise

## 2024-02-07 ENCOUNTER — PROCEDURE (OUTPATIENT)
Dept: SURGERY | Facility: CLINIC | Age: 54
End: 2024-02-07
Payer: COMMERCIAL

## 2024-02-07 VITALS — DIASTOLIC BLOOD PRESSURE: 56 MMHG | SYSTOLIC BLOOD PRESSURE: 94 MMHG | HEART RATE: 78 BPM | RESPIRATION RATE: 16 BRPM

## 2024-02-07 DIAGNOSIS — C67.9 MALIGNANT NEOPLASM OF URINARY BLADDER, UNSPECIFIED SITE (HCC): Primary | ICD-10-CM

## 2024-02-07 PROCEDURE — 51720 TREATMENT OF BLADDER LESION: CPT | Performed by: PHYSICIAN ASSISTANT

## 2024-02-07 PROCEDURE — 3078F DIAST BP <80 MM HG: CPT | Performed by: UROLOGY

## 2024-02-07 PROCEDURE — 3074F SYST BP LT 130 MM HG: CPT | Performed by: UROLOGY

## 2024-02-07 NOTE — PROGRESS NOTES
I determined that pt's urine test results were neg and no PA is needed. So I took the BCG 25 mg and I called pt into the exam room and introduced myself and verified the pt's name and . I explained that I will be placing the cath for his BCG TX and he agreed to proceed. I took his VS's and I explained that we need a new consent for each set of TX's so I asked that he review the consent and sign it. Pt did this and I then signed the consent as his witness.  t and I verified the spelling of his last name and his . I informed pt that I generated a Chemo consent and that he should read it and sign it and I will also sign as his witness and also have KHB sign it. Pt signed the consent as did I and FREDDIE also. I gave pt a copy of the consent and sent the original to scanning. I then asked the pt to step up onto the exam table step and lower his bottom clothing to his ankles. I placed him in supine position on the exam table and I then proceeded to prep and drape the pt in sterile fashion and I then instilled about 2/3 of the Urojet (Lidocaine HCL 2 %- 20 ml ) into the pt's urethra and I then constricted the head of the penis for about 1-2 min to allow sufficient numbing. I then proceeded to insert a 14 Fr coude tipped catheter into the bladder and received about 30 ml of light yellow colored urine. I went out of the room to get KHB and we returned to the room together and we conducted the timeout and then KHB instilled the BCG 50 mg without incident and pt tolerated it well. KHB then removed the catheter and all of the chemo waste was disposed of into the yellow chemo bag. I gave pt wipes to clean up and told him to redress and that I would return in a minute. I disposed of the chemo bag into the yellow chemo bucket in the dirty utility room. I went back to the room and reviewed the BCG aftercare instructions with pt and also asked that he return next week for his next TX and I sent him on his way. I then cleaned the  room with the orange topped bleach wipes as per protocol.

## 2024-02-07 NOTE — PROGRESS NOTES
Subjective:   Patient ID: Sharan Finnegan is a 53 year old male.    HPI  Sharan Finnegan is a 53 year old male who presents to the clinic for BCG instillation. This will be the patients 1st of 3 instillations of BCG.      History/Other:   A comprehensive 5 point review of systems was completed.  Pertinent positives and negatives noted in the the HPI.   Current Outpatient Medications   Medication Sig Dispense Refill    Testosterone Cypionate 200 MG/ML Injection Solution Inject into the skin.      OZEMPIC, 0.25 OR 0.5 MG/DOSE, 2 MG/3ML Subcutaneous Solution Pen-injector inject 0.25 mg subcutaneously weekly for 30 days and then inject 0.5 mg weekly Subcutaneously for 2 weeks.      mometasone 0.1 % External Ointment Apply 1 Application topically daily. 1 each 0    metFORMIN 500 MG Oral Tab Take 1 tablet (500 mg total) by mouth 2 (two) times daily with meals. 60 tablet 0    chlorhexidine (HIBICLENS) 4 % External Liquid Apply 30 mL topically daily as needed. 946 mL 0    lisinopril 20 MG Oral Tab Take 1 tablet (20 mg total) by mouth daily.      Multiple Vitamins-Minerals (CENTRUM MEN OR) Take 1 tablet by mouth daily.       Allergies:No Known Allergies    Objective:   Physical Exam  Constitutional:       Appearance: Normal appearance.   HENT:      Head: Normocephalic.   Eyes:      General: No scleral icterus.     Extraocular Movements: Extraocular movements intact.   Pulmonary:      Effort: Pulmonary effort is normal. No respiratory distress.   Neurological:      Mental Status: He is alert and oriented to person, place, and time.   Psychiatric:         Mood and Affect: Mood normal.         Behavior: Behavior normal.         Assessment & Plan:   No diagnosis found.      No orders of the defined types were placed in this encounter.    Sharan Finnegan is a 53 year old  male who presents to the clinic for BCG instillation. This is the 1st of 3 instillations. Urine culture from 02/05/2024 no growth.He denies any symptoms to suggest  a UTI.       I explained that there is no contraindication to performing his BCG instillation today.  Consent has been signed.  He confirms he has bleach available at home.  14 fr catheter was placed by RN using sterile technique.  Return of clear yellow urine.  BCG half strength (25mg)  was gently instilled into the bladder, patient tolerated well.  Catheter was them removed.  Supplies were disposed of using standard BCG protocol.         Patient will return in one week. Patient agreeable.   Meds This Visit:  Requested Prescriptions      No prescriptions requested or ordered in this encounter       Imaging & Referrals:  None    Mal Maldonado PA-C  February 07, 2024

## 2024-02-12 ENCOUNTER — LAB ENCOUNTER (OUTPATIENT)
Dept: LAB | Facility: HOSPITAL | Age: 54
End: 2024-02-12
Attending: UROLOGY
Payer: COMMERCIAL

## 2024-02-12 DIAGNOSIS — C67.9 MALIGNANT NEOPLASM OF URINARY BLADDER, UNSPECIFIED SITE (HCC): ICD-10-CM

## 2024-02-12 LAB
BILIRUB UR QL: NEGATIVE
CLARITY UR: CLEAR
GLUCOSE UR-MCNC: NORMAL MG/DL
HGB UR QL STRIP.AUTO: NEGATIVE
KETONES UR-MCNC: NEGATIVE MG/DL
LEUKOCYTE ESTERASE UR QL STRIP.AUTO: 25
NITRITE UR QL STRIP.AUTO: NEGATIVE
PH UR: 5 [PH] (ref 5–8)
PROT UR-MCNC: NEGATIVE MG/DL
SP GR UR STRIP: 1.02 (ref 1–1.03)
UROBILINOGEN UR STRIP-ACNC: NORMAL

## 2024-02-12 PROCEDURE — 87086 URINE CULTURE/COLONY COUNT: CPT

## 2024-02-12 PROCEDURE — 81001 URINALYSIS AUTO W/SCOPE: CPT

## 2024-02-14 ENCOUNTER — PROCEDURE (OUTPATIENT)
Dept: SURGERY | Facility: CLINIC | Age: 54
End: 2024-02-14
Payer: COMMERCIAL

## 2024-02-14 ENCOUNTER — LAB ENCOUNTER (OUTPATIENT)
Dept: LAB | Facility: HOSPITAL | Age: 54
End: 2024-02-14
Attending: INTERNAL MEDICINE
Payer: COMMERCIAL

## 2024-02-14 VITALS — SYSTOLIC BLOOD PRESSURE: 120 MMHG | DIASTOLIC BLOOD PRESSURE: 83 MMHG | HEART RATE: 83 BPM

## 2024-02-14 DIAGNOSIS — C68.9 MALIGNANT NEOPLASM OF URINARY ORGAN (HCC): Primary | ICD-10-CM

## 2024-02-14 DIAGNOSIS — C67.9 MALIGNANT NEOPLASM OF URINARY BLADDER, UNSPECIFIED SITE (HCC): Primary | ICD-10-CM

## 2024-02-14 DIAGNOSIS — R89.1 ABNORMAL LEVEL OF HORMONES IN SPECIMENS FROM OTH ORG/TISS: ICD-10-CM

## 2024-02-14 DIAGNOSIS — D49.4 BLADDER TUMOR: ICD-10-CM

## 2024-02-14 PROCEDURE — 84402 ASSAY OF FREE TESTOSTERONE: CPT

## 2024-02-14 PROCEDURE — 3079F DIAST BP 80-89 MM HG: CPT | Performed by: PHYSICIAN ASSISTANT

## 2024-02-14 PROCEDURE — 3074F SYST BP LT 130 MM HG: CPT | Performed by: PHYSICIAN ASSISTANT

## 2024-02-14 PROCEDURE — 36415 COLL VENOUS BLD VENIPUNCTURE: CPT

## 2024-02-14 PROCEDURE — 51720 TREATMENT OF BLADDER LESION: CPT | Performed by: PHYSICIAN ASSISTANT

## 2024-02-14 PROCEDURE — 84403 ASSAY OF TOTAL TESTOSTERONE: CPT

## 2024-02-14 NOTE — PROGRESS NOTES
Subjective:   Patient ID: Sharan Finnegan is a 53 year old male.    HPI  Sharan Finnegan is a 53 year old male who presents to the clinic for BCG instillation. This will be the patients 2nd of 3 instillations of BCG.      History/Other:   A comprehensive 5 point review of systems was completed.  Pertinent positives and negatives noted in the the HPI.   Current Outpatient Medications   Medication Sig Dispense Refill    Testosterone Cypionate 200 MG/ML Injection Solution Inject into the skin.      OZEMPIC, 0.25 OR 0.5 MG/DOSE, 2 MG/3ML Subcutaneous Solution Pen-injector inject 0.25 mg subcutaneously weekly for 30 days and then inject 0.5 mg weekly Subcutaneously for 2 weeks.      mometasone 0.1 % External Ointment Apply 1 Application topically daily. 1 each 0    metFORMIN 500 MG Oral Tab Take 1 tablet (500 mg total) by mouth 2 (two) times daily with meals. 60 tablet 0    chlorhexidine (HIBICLENS) 4 % External Liquid Apply 30 mL topically daily as needed. 946 mL 0    lisinopril 20 MG Oral Tab Take 1 tablet (20 mg total) by mouth daily.      Multiple Vitamins-Minerals (CENTRUM MEN OR) Take 1 tablet by mouth daily.       Allergies:No Known Allergies    Objective:   Physical Exam  Constitutional:       Appearance: Normal appearance.   HENT:      Head: Normocephalic.   Eyes:      General: No scleral icterus.     Extraocular Movements: Extraocular movements intact.   Pulmonary:      Effort: Pulmonary effort is normal. No respiratory distress.   Neurological:      Mental Status: He is alert and oriented to person, place, and time.   Psychiatric:         Mood and Affect: Mood normal.         Behavior: Behavior normal.         Assessment & Plan:   No diagnosis found.      No orders of the defined types were placed in this encounter.    Sharan Finnegan is a 53 year old  male who presents to the clinic for BCG instillation. This is the 2nd of 3 instillations. Urine culture from 02/12/2024 shows no growth.He denies any symptoms to  suggest a UTI.       I explained that there is no contraindication to performing his BCG instillation today.  Consent has been signed.  He confirms he has bleach available at home.  14 fr catheter was placed by RN using sterile technique.  Return of clear yellow urine.  BCG half strength (25mg)  was gently instilled into the bladder, patient tolerated well.  Catheter was them removed.  Supplies were disposed of using standard BCG protocol.         Patient will return in one week. Patient agreeable.   Meds This Visit:  Requested Prescriptions      No prescriptions requested or ordered in this encounter       Imaging & Referrals:  None    Mal Maldonado PA-C  February 14, 2024

## 2024-02-19 ENCOUNTER — LAB ENCOUNTER (OUTPATIENT)
Dept: LAB | Facility: HOSPITAL | Age: 54
End: 2024-02-19
Attending: UROLOGY
Payer: COMMERCIAL

## 2024-02-19 DIAGNOSIS — C67.9 MALIGNANT NEOPLASM OF URINARY BLADDER, UNSPECIFIED SITE (HCC): ICD-10-CM

## 2024-02-19 LAB
BILIRUB UR QL: NEGATIVE
CLARITY UR: CLEAR
GLUCOSE UR-MCNC: NORMAL MG/DL
HGB UR QL STRIP.AUTO: NEGATIVE
KETONES UR-MCNC: NEGATIVE MG/DL
LEUKOCYTE ESTERASE UR QL STRIP.AUTO: 75
NITRITE UR QL STRIP.AUTO: NEGATIVE
PH UR: 5 [PH] (ref 5–8)
PROT UR-MCNC: NEGATIVE MG/DL
SP GR UR STRIP: 1.02 (ref 1–1.03)
UROBILINOGEN UR STRIP-ACNC: NORMAL

## 2024-02-19 PROCEDURE — 81001 URINALYSIS AUTO W/SCOPE: CPT

## 2024-02-19 PROCEDURE — 87086 URINE CULTURE/COLONY COUNT: CPT

## 2024-02-21 ENCOUNTER — PROCEDURE (OUTPATIENT)
Dept: SURGERY | Facility: CLINIC | Age: 54
End: 2024-02-21
Payer: COMMERCIAL

## 2024-02-21 VITALS — HEART RATE: 79 BPM | DIASTOLIC BLOOD PRESSURE: 88 MMHG | SYSTOLIC BLOOD PRESSURE: 131 MMHG

## 2024-02-21 DIAGNOSIS — D49.4 BLADDER TUMOR: ICD-10-CM

## 2024-02-21 DIAGNOSIS — C67.9 MALIGNANT NEOPLASM OF URINARY BLADDER, UNSPECIFIED SITE (HCC): Primary | ICD-10-CM

## 2024-02-21 PROCEDURE — 3079F DIAST BP 80-89 MM HG: CPT | Performed by: PHYSICIAN ASSISTANT

## 2024-02-21 PROCEDURE — 51720 TREATMENT OF BLADDER LESION: CPT | Performed by: PHYSICIAN ASSISTANT

## 2024-02-21 PROCEDURE — 3075F SYST BP GE 130 - 139MM HG: CPT | Performed by: PHYSICIAN ASSISTANT

## 2024-02-21 NOTE — PROGRESS NOTES
Subjective:   Patient ID: Sharan Finnegan is a 53 year old male.    HPI  Sharan Finnegan is a 53 year old male who presents to the clinic for BCG instillation. This will be the patients 3rd of 3 instillations of BCG.      History/Other:   A comprehensive 5 point review of systems was completed.  Pertinent positives and negatives noted in the the HPI.   Current Outpatient Medications   Medication Sig Dispense Refill    Testosterone Cypionate 200 MG/ML Injection Solution Inject into the skin.      OZEMPIC, 0.25 OR 0.5 MG/DOSE, 2 MG/3ML Subcutaneous Solution Pen-injector inject 0.25 mg subcutaneously weekly for 30 days and then inject 0.5 mg weekly Subcutaneously for 2 weeks.      mometasone 0.1 % External Ointment Apply 1 Application topically daily. 1 each 0    metFORMIN 500 MG Oral Tab Take 1 tablet (500 mg total) by mouth 2 (two) times daily with meals. 60 tablet 0    chlorhexidine (HIBICLENS) 4 % External Liquid Apply 30 mL topically daily as needed. 946 mL 0    lisinopril 20 MG Oral Tab Take 1 tablet (20 mg total) by mouth daily.      Multiple Vitamins-Minerals (CENTRUM MEN OR) Take 1 tablet by mouth daily.       Allergies:No Known Allergies    Objective:   Physical Exam  Constitutional:       Appearance: Normal appearance.   HENT:      Head: Normocephalic.   Eyes:      General: No scleral icterus.     Extraocular Movements: Extraocular movements intact.   Pulmonary:      Effort: Pulmonary effort is normal. No respiratory distress.   Neurological:      Mental Status: He is alert and oriented to person, place, and time.   Psychiatric:         Mood and Affect: Mood normal.         Behavior: Behavior normal.         Assessment & Plan:   No diagnosis found.      No orders of the defined types were placed in this encounter.    Sharan Finnegan is a 53 year old  male who presents to the clinic for BCG instillation. This is the 3rd of 3 instillations. Urine culture from 02/19/2024 shows no growth.He denies any symptoms to  suggest a UTI.       I explained that there is no contraindication to performing his BCG instillation today.  Consent has been signed.  He confirms he has bleach available at home.  14 fr catheter was placed by RN using sterile technique.  Return of clear yellow urine.  BCG half strength (25mg)  was gently instilled into the bladder, patient tolerated well.  Catheter was them removed.  Supplies were disposed of using standard BCG protocol.         Patient will return in one week. Patient agreeable.   Meds This Visit:  Requested Prescriptions      No prescriptions requested or ordered in this encounter       Imaging & Referrals:  None    Mal Maldonado PA-C  February 21, 2024

## 2024-02-23 LAB
FREE TESTOST DIRECT: 5.9 PG/ML
TESTOSTERONE: 56 NG/DL

## 2024-04-09 ENCOUNTER — TELEPHONE (OUTPATIENT)
Dept: SURGERY | Facility: CLINIC | Age: 54
End: 2024-04-09

## 2024-04-09 ENCOUNTER — LAB ENCOUNTER (OUTPATIENT)
Dept: LAB | Facility: HOSPITAL | Age: 54
End: 2024-04-09
Attending: UROLOGY
Payer: COMMERCIAL

## 2024-04-09 ENCOUNTER — PROCEDURE (OUTPATIENT)
Dept: SURGERY | Facility: CLINIC | Age: 54
End: 2024-04-09
Payer: COMMERCIAL

## 2024-04-09 VITALS — SYSTOLIC BLOOD PRESSURE: 108 MMHG | DIASTOLIC BLOOD PRESSURE: 70 MMHG

## 2024-04-09 DIAGNOSIS — C67.9 MALIGNANT NEOPLASM OF URINARY BLADDER, UNSPECIFIED SITE (HCC): Primary | ICD-10-CM

## 2024-04-09 DIAGNOSIS — D49.4 BLADDER TUMOR: Primary | ICD-10-CM

## 2024-04-09 DIAGNOSIS — C67.9 MALIGNANT NEOPLASM OF URINARY BLADDER, UNSPECIFIED SITE (HCC): ICD-10-CM

## 2024-04-09 LAB
ANION GAP SERPL CALC-SCNC: 5 MMOL/L (ref 0–18)
BUN BLD-MCNC: 10 MG/DL (ref 9–23)
BUN/CREAT SERPL: 11.5 (ref 10–20)
CALCIUM BLD-MCNC: 9.2 MG/DL (ref 8.7–10.4)
CHLORIDE SERPL-SCNC: 107 MMOL/L (ref 98–112)
CO2 SERPL-SCNC: 26 MMOL/L (ref 21–32)
CREAT BLD-MCNC: 0.87 MG/DL
DEPRECATED RDW RBC AUTO: 43.8 FL (ref 35.1–46.3)
EGFRCR SERPLBLD CKD-EPI 2021: 102 ML/MIN/1.73M2 (ref 60–?)
ERYTHROCYTE [DISTWIDTH] IN BLOOD BY AUTOMATED COUNT: 13.8 % (ref 11–15)
FASTING STATUS PATIENT QL REPORTED: NO
GLUCOSE BLD-MCNC: 116 MG/DL (ref 70–99)
HCT VFR BLD AUTO: 47.2 %
HGB BLD-MCNC: 15.7 G/DL
INR BLD: 0.91 (ref 0.8–1.2)
MCH RBC QN AUTO: 28.9 PG (ref 26–34)
MCHC RBC AUTO-ENTMCNC: 33.3 G/DL (ref 31–37)
MCV RBC AUTO: 86.8 FL
OSMOLALITY SERPL CALC.SUM OF ELEC: 286 MOSM/KG (ref 275–295)
PLATELET # BLD AUTO: 325 10(3)UL (ref 150–450)
POTASSIUM SERPL-SCNC: 3.9 MMOL/L (ref 3.5–5.1)
PROTHROMBIN TIME: 12.8 SECONDS (ref 11.6–14.8)
RBC # BLD AUTO: 5.44 X10(6)UL
SODIUM SERPL-SCNC: 138 MMOL/L (ref 136–145)
WBC # BLD AUTO: 8.8 X10(3) UL (ref 4–11)

## 2024-04-09 PROCEDURE — 85027 COMPLETE CBC AUTOMATED: CPT

## 2024-04-09 PROCEDURE — 3078F DIAST BP <80 MM HG: CPT | Performed by: UROLOGY

## 2024-04-09 PROCEDURE — 36415 COLL VENOUS BLD VENIPUNCTURE: CPT

## 2024-04-09 PROCEDURE — 52000 CYSTOURETHROSCOPY: CPT | Performed by: UROLOGY

## 2024-04-09 PROCEDURE — 3074F SYST BP LT 130 MM HG: CPT | Performed by: UROLOGY

## 2024-04-09 PROCEDURE — 80048 BASIC METABOLIC PNL TOTAL CA: CPT

## 2024-04-09 PROCEDURE — 85610 PROTHROMBIN TIME: CPT

## 2024-04-09 RX ORDER — LIDOCAINE HYDROCHLORIDE 20 MG/ML
10 JELLY TOPICAL ONCE
Start: 2024-04-09 | End: 2024-04-09

## 2024-04-09 NOTE — TELEPHONE ENCOUNTER
- Using availity, no auth required (see below).  Also printed and placed in to scanning for pt's chart.

## 2024-04-09 NOTE — TELEPHONE ENCOUNTER
- order for BCG x 3 weeks, starting week of 5/6 faxed to [phone].  Please schedule pt for w/o 5/6 if possible.    - Checked prior auth, none needed (see below)

## 2024-04-09 NOTE — PROCEDURES
CYSTOSCOPY (MALE)     PRE-OP DIAGNOSIS: HgT1/CIS     POST-OP DIAGNOSIS: same     PROCEDURE: Cystsocopy     SURGEON: Maggie Alvarado MD     ASSISTANT: none      EBL: minimal     FINDINGS:   Urethra: No meatal stenosis, no anterior or posterior urethral strictures, open bladder neck   Prostate: Bilobar coaptation with mild intravesical component   Bladder: Bilateral ureteral orifices in orthotopic position with efflux, no suspicious or concerning erythematous lesions, no papillary bladder tumors, no stones; small erythematous lesion at the base of the bladder (right) and the dome of the bladder with adjacent calcification of uncertain significance  Retroflexion: Mild intravesical component of prostate   Other findings: none     INDICATIONS: HgT1 in 2023. Repeat TUR with HgTa, T1 and CIS. Offered cystectomy, declined. Underwent BCG. First surveillance cystoscopy with HgTa recurrence. He underwent re-induction BCG. Presents for cysto.     PROCEDURE: Patient was brought to the procedure suite and a timeout was performed identifying the patient,  and procedure being performed.  The risks of the procedure were once again detailed to the patient including bleeding, infection, dysuria.  The patient agreed to proceed.  The patient had a negative urinalysis.  No antibiotics were given to patient prior to this procedure.      He was placed in a supine position on the table and a flexible cystoscope was inserted per urethra.  There were no obvious urethral strictures in the anterior, membranous or posterior urethra.  The prostate appeared enlarged.  Once in the bladder we performed a full diagnostic/surveillance cystoscopy which demonstrated a scar but no recurrence of tumors. I did see two areas that looked slightly red and raised, but not concerning.  On retroflexion we noted no abnormalities.  The scope was then carefully removed and once again no urethral abnormalities were noted.    There were no complications after  this procedure and the patient tolerated the procedure without issue.    IMPRESSION: cysto with no obvious recurrences. Underwent two induction cycles of BCG. Had maintenance x1 in Feb 2024.  Will need to start second maintenance cycle Cytology today. Cytology and cystoscopy in 3 months. BCG to start in May 2024.     We will proceed with biopsy in the OR to ensure no recurrences. Will still set up BCG as the areas do not look convincing for recurrence. But will await pathology to .      PLAN:    Cytology today  Cytology and cystoscopy in 3 months  Bcg in May 2024 (2/1/2024)  OR: cystoscopy, bladder biopsy, fulguration  Cbc, chem7, inr, urine culture

## 2024-04-09 NOTE — TELEPHONE ENCOUNTER
- per AR, pt to be scheduled 3 months from the start of his last maintenance dosing, which was 2/7/24, so should be scheduled for start w/o 5/6/24.

## 2024-04-09 NOTE — TELEPHONE ENCOUNTER
Hi!  This patient needs a transurethral resection of bladder tumor. Should be booked for 15 minutes next Thursday. Needs labs as ordered below.    Thanks!  AR    Urology Surgery Scheduling Request    Location: [facility] OR    Surgeon: JAY Alvarado MD    Asst. Surgeon: N/A    Diagnosis: Bladder tumor    Procedure: Cystoscopy transurethral resection of bladder tumor     Anesthesia: General     Time Frame: April 18    Time needed: 15 minutes    Special Equipment: Resectoscope    On Call to OR: Ancef     Admission: Day Surgery    Pre-op Testing: CBC, CMP, PT/INR and Urine Culture     Need Pre-op Clearance: none    Estimated Post Op/Follow Up Appt: tbd.    Maggie Alvarado MD

## 2024-04-09 NOTE — TELEPHONE ENCOUNTER
- pt to be scheduled for BCG x 3 weeks, starting w/o 5/6/24.  Pt has [employer] [place] PPO and may need PA.

## 2024-04-09 NOTE — TELEPHONE ENCOUNTER
Patient seen in office today for BTS cystoscopy. PT will need to be scheduled for BCG x3 in May.     Patient may also need prior auth. Please advise.

## 2024-04-10 LAB — NON GYNE INTERPRETATION: NEGATIVE

## 2024-04-11 ENCOUNTER — TELEPHONE (OUTPATIENT)
Dept: HEMATOLOGY/ONCOLOGY | Age: 54
End: 2024-04-11

## 2024-04-15 NOTE — DISCHARGE INSTRUCTIONS
HOME INSTRUCTIONS        It is imperative that you avoid heavy lifting over 10 pounds (a gallon of milk) for 2-4weeks.  It is also imperative that you avoid constipation, coughing or any pressure on the pelvis that could lead to further bleeding.  Please avoid physical therapy for the next 4 weeks minimum.    I have prescribed 3 days of an antibiotic that I require that you take.take stool softeners if you are constipated.  If you have diarrhea while on these medications, you can stop taking the stool softeners.    If you have any fevers, chills, the catheter stops draining, you are worried about the color of the urine (it is very dark and you cannot see through it) please go to the emergency room.    Call T: [phone] if you have any questions or concerns        [facility] HOME CARE INSTRUCTIONS: POST-OP ANESTHESIA  The medication that you received for sedation or general anesthesia can last up to 24 hours. Your judgment and reflexes may be altered, even if you feel like your normal self.      We Recommend:   Do not drive any motor vehicle or bicycle   Avoid mowing the lawn, playing sports, or working with power tools/applicances (power saws, electric knives or mixers)   That you have someone stay with you on your first night home   Do not drink alcohol or take sleeping pills or tranquilizers   Do not sign legal documents within 24 hours of your procedure   If you had a nerve block for your surgery, take extra care not to put any pressure on your arm or hand for 24 hours    It is normal:  For you to have a sore throat if you had a breathing tube during surgery (while you were asleep!). The sore throat should get better within 48 hours. You can gargle with warm salt water (1/2 tsp in 4 oz warm water) or use a throat lozenge for comfort  To feel muscle aches or soreness especially in the abdomen, chest or neck. The achy feeling should go away in the next 24 hours  To feel weak, sleepy or \"wiped out\". Your should  start feeling better in the next 24 hours.   To experience mild discomforts such as sore lip or tongue, headache, cramps, gas pains or a bloated feeling in your abdomen.   To experience mild back pain or soreness for a day or two if you had spinal or epidural anesthesia.   If you had laparoscopic surgery, to feel shoulder pain or discomfort on the day of surgery.   For some patients to have nausea after surgery/anesthesia    If you feel nausea or experience vomiting:   Try to move around less.   Eat less than usual or drink only liquids until the next morning   Nausea should resolve in about 24 hours    If you have a problem when you are at home:    Call your surgeons office   Discharge Instructions: After Your Surgery  You’ve just had surgery. During surgery, you were given medicine called anesthesia to keep you relaxed and free of pain. After surgery, you may have some pain or nausea. This is common. Here are some tips for feeling better and getting well after surgery.   Going home  Your healthcare provider will show you how to take care of yourself when you go home. They'll also answer your questions. Have an adult family member or friend drive you home. For the first 24 hours after your surgery:   Don't drive or use heavy equipment.  Don't make important decisions or sign legal papers.  Take medicines as directed.  Don't drink alcohol.  Have someone stay with you, if needed. They can watch for problems and help keep you safe.  Be sure to go to all follow-up visits with your healthcare provider. And rest after your surgery for as long as your provider tells you to.   Coping with pain  If you have pain after surgery, pain medicine will help you feel better. Take it as directed, before pain becomes severe. Also, ask your healthcare provider or pharmacist about other ways to control pain. This might be with heat, ice, or relaxation. And follow any other instructions your surgeon or nurse gives you.      Stay on  schedule with your medicine.     Tips for taking pain medicine  To get the best relief possible, remember these points:   Pain medicines can upset your stomach. Taking them with a little food may help.  Most pain relievers taken by mouth need at least 20 to 30 minutes to start to work.  Don't wait till your pain becomes severe before you take your medicine. Try to time your medicine so that you can take it before starting an activity. This might be before you get dressed, go for a walk, or sit down for dinner.  Constipation is a common side effect of some pain medicines. Call your healthcare provider before taking any medicines such as laxatives or stool softeners to help ease constipation. Also ask if you should skip any foods. Drinking lots of fluids and eating foods such as fruits and vegetables that are high in fiber can also help. Remember, don't take laxatives unless your surgeon has prescribed them.  Drinking alcohol and taking pain medicine can cause dizziness and slow your breathing. It can even be deadly. Don't drink alcohol while taking pain medicine.  Pain medicine can make you react more slowly to things. Don't drive or run machinery while taking pain medicine.  Your healthcare provider may tell you to take acetaminophen to help ease your pain. Ask them how much you're supposed to take each day. Acetaminophen or other pain relievers may interact with your prescription medicines or other over-the-counter (OTC) medicines. Some prescription medicines have acetaminophen and other ingredients in them. Using both prescription and OTC acetaminophen for pain can cause you to accidentally overdose. Read the labels on your OTC medicines with care. This will help you to clearly know the list of ingredients, how much to take, and any warnings. It may also help you not take too much acetaminophen. If you have questions or don't understand the information, ask your pharmacist or healthcare provider to explain it to  you before you take the OTC medicine.   Managing nausea  Some people have an upset stomach (nausea) after surgery. This is often because of anesthesia, pain, or pain medicine, less movement of food in the stomach, or the stress of surgery. These tips will help you handle nausea and eat healthy foods as you get better. If you were on a special food plan before surgery, ask your healthcare provider if you should follow it while you get better. Check with your provider on how your eating should progress. It may depend on the surgery you had. These general tips may help:   Don't push yourself to eat. Your body will tell you when to eat and how much.  Start off with clear liquids and soup. They're easier to digest.  Next try semi-solid foods as you feel ready. These include mashed potatoes, applesauce, and gelatin.  Slowly move to solid foods. Don’t eat fatty, rich, or spicy foods at first.  Don't force yourself to have 3 large meals a day. Instead eat smaller amounts more often.  Take pain medicines with a small amount of solid food, such as crackers or toast. This helps prevent nausea.  When to call your healthcare provider  Call your healthcare provider right away if you have any of these:   You still have too much pain, or the pain gets worse, after taking the medicine. The medicine may not be strong enough. Or there may be a complication from the surgery.  You feel too sleepy, dizzy, or groggy. The medicine may be too strong.  Side effects such as nausea or vomiting. Your healthcare provider may advise taking other medicines to .  Skin changes such as rash, itching, or hives. This may mean you have an allergic reaction. Your provider may advise taking other medicines.  The incision looks different (for instance, part of it opens up).  Bleeding or fluid leaking from the incision site, and weren't told to expect that.  Fever of 100.4°F (38°C) or higher, or as directed by your provider.  Call 911  Call 911 right away  if you have:   Trouble breathing  Facial swelling    If you have obstructive sleep apnea   You were given anesthesia medicine during surgery to keep you comfortable and free of pain. After surgery, you may have more apnea spells because of this medicine and other medicines you were given. The spells may last longer than normal.    At home:  Keep using the continuous positive airway pressure (CPAP) device when you sleep. Unless your healthcare provider tells you not to, use it when you sleep, day or night. CPAP is a common device used to treat obstructive sleep apnea.  Talk with your provider before taking any pain medicine, muscle relaxants, or sedatives. Your provider will tell you about the possible dangers of taking these medicines.  Contact your provider if your sleeping changes a lot even when taking medicines as directed.  Rosario last reviewed this educational content on 10/1/2021  © 7137-7924 [employer]. All rights reserved. This information is not intended as a substitute for professional medical care. Always follow your healthcare professional's instructions.

## 2024-04-18 ENCOUNTER — HOSPITAL ENCOUNTER (OUTPATIENT)
Facility: HOSPITAL | Age: 54
Setting detail: HOSPITAL OUTPATIENT SURGERY
Discharge: HOME | End: 2024-04-18
Attending: UROLOGY | Admitting: UROLOGY
Payer: COMMERCIAL

## 2024-04-18 ENCOUNTER — ANESTHESIA EVENT (OUTPATIENT)
Dept: SURGERY | Facility: HOSPITAL | Age: 54
End: 2024-04-18
Payer: COMMERCIAL

## 2024-04-18 ENCOUNTER — ANESTHESIA (OUTPATIENT)
Dept: SURGERY | Facility: HOSPITAL | Age: 54
End: 2024-04-18
Payer: COMMERCIAL

## 2024-04-18 VITALS
SYSTOLIC BLOOD PRESSURE: 117 MMHG | DIASTOLIC BLOOD PRESSURE: 72 MMHG | BODY MASS INDEX: 39.71 KG/M2 | TEMPERATURE: 98 F | OXYGEN SATURATION: 95 % | RESPIRATION RATE: 16 BRPM | WEIGHT: 253 LBS | HEART RATE: 73 BPM | HEIGHT: 67 IN

## 2024-04-18 DIAGNOSIS — D49.4 BLADDER TUMOR: ICD-10-CM

## 2024-04-18 LAB
GLUCOSE BLDC GLUCOMTR-MCNC: 112 MG/DL (ref 70–99)
GLUCOSE BLDC GLUCOMTR-MCNC: 142 MG/DL (ref 70–99)

## 2024-04-18 PROCEDURE — 52234 CYSTOSCOPY AND TREATMENT: CPT | Performed by: UROLOGY

## 2024-04-18 PROCEDURE — 0TBB8ZZ EXCISION OF BLADDER, VIA NATURAL OR ARTIFICIAL OPENING ENDOSCOPIC: ICD-10-PCS | Performed by: UROLOGY

## 2024-04-18 RX ORDER — FAMOTIDINE 10 MG/ML
20 INJECTION, SOLUTION INTRAVENOUS ONCE
Status: COMPLETED | OUTPATIENT
Start: 2024-04-18 | End: 2024-04-18

## 2024-04-18 RX ORDER — ALBUTEROL SULFATE 2.5 MG/3ML
2.5 SOLUTION RESPIRATORY (INHALATION) AS NEEDED
Status: DISCONTINUED | OUTPATIENT
Start: 2024-04-18 | End: 2024-04-18

## 2024-04-18 RX ORDER — SULFAMETHOXAZOLE AND TRIMETHOPRIM 800; 160 MG/1; MG/1
1 TABLET ORAL 2 TIMES DAILY
Qty: 6 TABLET | Refills: 0 | Status: SHIPPED | OUTPATIENT
Start: 2024-04-18 | End: 2024-04-21

## 2024-04-18 RX ORDER — DEXAMETHASONE SODIUM PHOSPHATE 4 MG/ML
VIAL (ML) INJECTION AS NEEDED
Status: DISCONTINUED | OUTPATIENT
Start: 2024-04-18 | End: 2024-04-18 | Stop reason: SURG

## 2024-04-18 RX ORDER — HYDROMORPHONE HYDROCHLORIDE 1 MG/ML
0.2 INJECTION, SOLUTION INTRAMUSCULAR; INTRAVENOUS; SUBCUTANEOUS EVERY 5 MIN PRN
Status: DISCONTINUED | OUTPATIENT
Start: 2024-04-18 | End: 2024-04-18

## 2024-04-18 RX ORDER — SODIUM CHLORIDE, SODIUM LACTATE, POTASSIUM CHLORIDE, CALCIUM CHLORIDE 600; 310; 30; 20 MG/100ML; MG/100ML; MG/100ML; MG/100ML
INJECTION, SOLUTION INTRAVENOUS CONTINUOUS
Status: DISCONTINUED | OUTPATIENT
Start: 2024-04-18 | End: 2024-04-18

## 2024-04-18 RX ORDER — ACETAMINOPHEN 500 MG
1000 TABLET ORAL ONCE
Status: COMPLETED | OUTPATIENT
Start: 2024-04-18 | End: 2024-04-18

## 2024-04-18 RX ORDER — NALOXONE HYDROCHLORIDE 0.4 MG/ML
0.08 INJECTION, SOLUTION INTRAMUSCULAR; INTRAVENOUS; SUBCUTANEOUS AS NEEDED
Status: DISCONTINUED | OUTPATIENT
Start: 2024-04-18 | End: 2024-04-18

## 2024-04-18 RX ORDER — ONDANSETRON 2 MG/ML
4 INJECTION INTRAMUSCULAR; INTRAVENOUS EVERY 6 HOURS PRN
Status: DISCONTINUED | OUTPATIENT
Start: 2024-04-18 | End: 2024-04-18

## 2024-04-18 RX ORDER — MORPHINE SULFATE 4 MG/ML
2 INJECTION, SOLUTION INTRAMUSCULAR; INTRAVENOUS EVERY 10 MIN PRN
Status: DISCONTINUED | OUTPATIENT
Start: 2024-04-18 | End: 2024-04-18

## 2024-04-18 RX ORDER — ONDANSETRON 2 MG/ML
INJECTION INTRAMUSCULAR; INTRAVENOUS AS NEEDED
Status: DISCONTINUED | OUTPATIENT
Start: 2024-04-18 | End: 2024-04-18 | Stop reason: SURG

## 2024-04-18 RX ORDER — METOCLOPRAMIDE 10 MG/1
10 TABLET ORAL ONCE
Status: COMPLETED | OUTPATIENT
Start: 2024-04-18 | End: 2024-04-18

## 2024-04-18 RX ORDER — PROCHLORPERAZINE EDISYLATE 5 MG/ML
5 INJECTION INTRAMUSCULAR; INTRAVENOUS EVERY 8 HOURS PRN
Status: DISCONTINUED | OUTPATIENT
Start: 2024-04-18 | End: 2024-04-18

## 2024-04-18 RX ORDER — LABETALOL HYDROCHLORIDE 5 MG/ML
5 INJECTION, SOLUTION INTRAVENOUS EVERY 5 MIN PRN
Status: DISCONTINUED | OUTPATIENT
Start: 2024-04-18 | End: 2024-04-18

## 2024-04-18 RX ORDER — HYDROMORPHONE HYDROCHLORIDE 1 MG/ML
0.4 INJECTION, SOLUTION INTRAMUSCULAR; INTRAVENOUS; SUBCUTANEOUS EVERY 5 MIN PRN
Status: DISCONTINUED | OUTPATIENT
Start: 2024-04-18 | End: 2024-04-18

## 2024-04-18 RX ORDER — HYDROMORPHONE HYDROCHLORIDE 1 MG/ML
0.6 INJECTION, SOLUTION INTRAMUSCULAR; INTRAVENOUS; SUBCUTANEOUS EVERY 5 MIN PRN
Status: DISCONTINUED | OUTPATIENT
Start: 2024-04-18 | End: 2024-04-18

## 2024-04-18 RX ORDER — DEXTROSE MONOHYDRATE 25 G/50ML
50 INJECTION, SOLUTION INTRAVENOUS
Status: DISCONTINUED | OUTPATIENT
Start: 2024-04-18 | End: 2024-04-18

## 2024-04-18 RX ORDER — NICOTINE POLACRILEX 4 MG
15 LOZENGE BUCCAL
Status: DISCONTINUED | OUTPATIENT
Start: 2024-04-18 | End: 2024-04-18

## 2024-04-18 RX ORDER — MORPHINE SULFATE 4 MG/ML
4 INJECTION, SOLUTION INTRAMUSCULAR; INTRAVENOUS EVERY 10 MIN PRN
Status: DISCONTINUED | OUTPATIENT
Start: 2024-04-18 | End: 2024-04-18

## 2024-04-18 RX ORDER — MORPHINE SULFATE 10 MG/ML
6 INJECTION, SOLUTION INTRAMUSCULAR; INTRAVENOUS EVERY 10 MIN PRN
Status: DISCONTINUED | OUTPATIENT
Start: 2024-04-18 | End: 2024-04-18

## 2024-04-18 RX ORDER — HYDRALAZINE HYDROCHLORIDE 20 MG/ML
5 INJECTION INTRAMUSCULAR; INTRAVENOUS EVERY 10 MIN PRN
Status: DISCONTINUED | OUTPATIENT
Start: 2024-04-18 | End: 2024-04-18

## 2024-04-18 RX ORDER — CEFAZOLIN SODIUM/WATER 2 G/20 ML
2 SYRINGE (ML) INTRAVENOUS ONCE
Status: COMPLETED | OUTPATIENT
Start: 2024-04-18 | End: 2024-04-18

## 2024-04-18 RX ORDER — METOCLOPRAMIDE HYDROCHLORIDE 5 MG/ML
10 INJECTION INTRAMUSCULAR; INTRAVENOUS ONCE
Status: COMPLETED | OUTPATIENT
Start: 2024-04-18 | End: 2024-04-18

## 2024-04-18 RX ORDER — LIDOCAINE HYDROCHLORIDE 10 MG/ML
INJECTION, SOLUTION EPIDURAL; INFILTRATION; INTRACAUDAL; PERINEURAL AS NEEDED
Status: DISCONTINUED | OUTPATIENT
Start: 2024-04-18 | End: 2024-04-18 | Stop reason: SURG

## 2024-04-18 RX ORDER — NICOTINE POLACRILEX 4 MG
30 LOZENGE BUCCAL
Status: DISCONTINUED | OUTPATIENT
Start: 2024-04-18 | End: 2024-04-18

## 2024-04-18 RX ORDER — ROCURONIUM BROMIDE 10 MG/ML
INJECTION, SOLUTION INTRAVENOUS AS NEEDED
Status: DISCONTINUED | OUTPATIENT
Start: 2024-04-18 | End: 2024-04-18 | Stop reason: SURG

## 2024-04-18 RX ORDER — FAMOTIDINE 20 MG/1
20 TABLET, FILM COATED ORAL ONCE
Status: COMPLETED | OUTPATIENT
Start: 2024-04-18 | End: 2024-04-18

## 2024-04-18 RX ADMIN — CEFAZOLIN SODIUM/WATER 2 G: 2 G/20 ML SYRINGE (ML) INTRAVENOUS at 14:19:00

## 2024-04-18 RX ADMIN — DEXAMETHASONE SODIUM PHOSPHATE 4 MG: 4 MG/ML VIAL (ML) INJECTION at 14:20:00

## 2024-04-18 RX ADMIN — SODIUM CHLORIDE, SODIUM LACTATE, POTASSIUM CHLORIDE, CALCIUM CHLORIDE: 600; 310; 30; 20 INJECTION, SOLUTION INTRAVENOUS at 14:51:00

## 2024-04-18 RX ADMIN — ROCURONIUM BROMIDE 25 MG: 10 INJECTION, SOLUTION INTRAVENOUS at 14:13:00

## 2024-04-18 RX ADMIN — ONDANSETRON 4 MG: 2 INJECTION INTRAMUSCULAR; INTRAVENOUS at 14:35:00

## 2024-04-18 RX ADMIN — LIDOCAINE HYDROCHLORIDE 30 MG: 10 INJECTION, SOLUTION EPIDURAL; INFILTRATION; INTRACAUDAL; PERINEURAL at 14:12:00

## 2024-04-18 NOTE — OPERATIVE REPORT
OPERATIVE REPORT  DATE OF SURGERY: 4/18/2024  PREOPERATIVE DIAGNOSIS: Bladder tumor  POSTOPERATIVE DIAGNOSIS: Same  PROCEDURE: Cystoscopy, transurethral resection of bladder tumor  SURGEON: Maggie Alvarado MD  ASSISTANT: none  ANESTHESIA: General ET tube  FLUIDS: Per anesthesia  URINE OUTPUT: Not applicable  ESTIMATED BLOOD LOSS: 3cc  SPECIMENS: bladder lesions  CONDITION: Stable to PACU    INDICATIONS: bladder cancer    PROCEDURE: The patient was taken to the operating room and given general anesthesia and then placed in yellowfin stirrups.  Patient received ancefantibiotic coverage before starting the case.    At this point, sounds were used to dilate the meatus to 30 [place].  The resectoscope passed easily in the meatus and into the bladder.  A full cystoscopy was performed which demonstrated two small erythematous areas. We then used the resectocope loop to resect the bladder tumor in its entirity. Area treated 2cm total. We used the loop to fulgurate the area completely. With the bladder decompressed there was no further bleeding. The specimens were removed and sent to pathology.  The patient was then awakened and transported to the PACU in good condition.    IMPRESSION: Status post TURBT    PLAN:  1. 3d abx  2. RTC 10 days at that time for pathology discussion

## 2024-04-18 NOTE — ANESTHESIA PREPROCEDURE EVALUATION
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    Cellulitis of left pinna        Past Medical History:    Calculus of kidney    Cancer (HCC)    bladder    Diabetes (HCC)    High blood pressure    Visual impairment    glasses       Past Surgical History:   Procedure Laterality Date    Fracture surgery Right     arm    Other surgical history Right     biceps muscle torn       Facility-Administered Medications Prior to Admission   Medication Dose Route Frequency Provider Last Rate Last Admin    BCG live ([place] BCG) injection 50 mg  50 mg Other Once Maggie Alvarado MD        [COMPLETED] BCG live ([place] BCG) injection 50 mg  50 mg Other Weekly Maggie Alvarado MD   25 mg at 02/21/24 1644     Medications Prior to Admission   Medication Sig Dispense Refill Last Dose    metFORMIN 500 MG Oral Tab Take 1 tablet (500 mg total) by mouth 2 (two) times daily with meals. 60 tablet 0     lisinopril 20 MG Oral Tab Take 1 tablet (20 mg total) by mouth daily.       Multiple Vitamins-Minerals (CENTRUM MEN OR) Take 1 tablet by mouth daily.   Past Month    Testosterone Cypionate 200 MG/ML Injection Solution Inject into the skin.       OZEMPIC, 0.25 OR 0.5 MG/DOSE, 2 MG/3ML Subcutaneous Solution Pen-injector inject 0.25 mg subcutaneously weekly for 30 days and then inject 0.5 mg weekly Subcutaneously for 2 weeks.   2/1/2024    mometasone 0.1 % External Ointment Apply 1  Application topically daily. 1 each 0     chlorhexidine (HIBICLENS) 4 % External Liquid Apply 30 mL topically daily as needed. 946 mL 0      Current Facility-Administered Medications Ordered in Epic   Medication Dose Route Frequency Provider Last Rate Last Admin    lactated ringers infusion   Intravenous Continuous Magige Alvarado MD        acetaminophen (Tylenol Extra Strength) tab 1,000 mg  1,000 mg Oral Once Maggie Alvarado MD        famotidine (Pepcid) tab 20 mg  20 mg Oral Once Maggie Alvarado MD        Or    famotidine (Pepcid) 20 mg/2mL injection 20 mg  20 mg Intravenous Once Maggie Alvarado MD        metoclopramide (Reglan) tab 10 mg  10 mg Oral Once Maggie Alvarado MD        Or    metoclopramide (Reglan) 5 mg/mL injection 10 mg  10 mg Intravenous Once Maggie Alvarado MD         No current [facility]-ordered outpatient medications on file.       No Known Allergies    Family History   Problem Relation Age of Onset    Hypertension Father     Cancer Mother     Hypertension Mother     Hypertension Brother      Social History     Socioeconomic History    Marital status:    Tobacco Use    Smoking status: Former     Current packs/day: 1.50     Average packs/day: 1.5 packs/day for 35.0 years (52.5 ttl pk-yrs)     Types: Cigarettes    Smokeless tobacco: Never   Vaping Use    Vaping status: Never Used   Substance and Sexual Activity    Alcohol use: Not Currently    Drug use: Not Currently       Available pre-op labs reviewed.  Lab Results   Component Value Date    WBC 8.8 04/09/2024    RBC 5.44 04/09/2024    HGB 15.7 04/09/2024    HCT 47.2 04/09/2024    MCV 86.8 04/09/2024    MCH 28.9 04/09/2024    MCHC 33.3 04/09/2024    RDW 13.8 04/09/2024    .0 04/09/2024     Lab Results   Component Value Date     04/09/2024    K 3.9 04/09/2024     04/09/2024    CO2 26.0 04/09/2024    BUN 10 04/09/2024    CREATSERUM 0.87 04/09/2024     (H) 04/09/2024    CA 9.2 04/09/2024     Lab Results    Component Value Date    INR 0.91 04/09/2024       Vital Signs:  Body mass index is 39.16 kg/m².   height is 1.702 m (5' 7\") and weight is 113.4 kg (250 lb).   Vitals:    04/15/24 1614   Weight: 113.4 kg (250 lb)   Height: 1.702 m (5' 7\")        Anesthesia Evaluation     Patient summary reviewed and Nursing notes reviewed    Airway   Dental      Pulmonary    (+) COPD mild    ROS comment: Previous smoker/52 yr pk hx  Cardiovascular   Exercise tolerance: poor  (+) hypertension    ROS comment: PAD    Neuro/Psych      GI/Hepatic/Renal    (+) chronic renal disease (Nephrolithiasis)    Comments: Bladder Ca    Endo/Other    (+) diabetes mellitus    Comments: Obesity  Abdominal                  Anesthesia Plan:   ASA:  3  Plan:   General  Airway:  ETT and Video laryngoscope  Informed Consent Plan and Risks Discussed With:  Patient  Discussed plan with:  Attending      I have informed Sharan Finnegan and/or legal guardian or family member of the nature of the anesthetic plan, benefits, risks including possible dental damage if relevant, major complications, and any alternative forms of anesthetic management.   All of the patient's questions were answered to the best of my ability. The patient desires the anesthetic management as planned.  Richie Hughes MD  4/18/2024 1:20 PM  Present on Admission:  **None**

## 2024-04-18 NOTE — PROGRESS NOTES
[facility] Dosing Service  Antibiotic Dosing    Sharan Finnegan is a 54 year old for whom pharmacy is dosing cefazolin for treatment of  surgical prophylaxis.     Allergies: has No Known Allergies.    Vitals: Ht 1.702 m (5' 7\")   Wt 113.4 kg (250 lb)   BMI 39.16 kg/m²     Labs:   WBC   Date Value Ref Range Status   04/09/2024 8.8 4.0 - 11.0 x10(3) uL Final     Creatinine   Date Value Ref Range Status   04/09/2024 0.87 0.70 - 1.30 mg/dL Final     BUN   Date Value Ref Range Status   04/09/2024 10 9 - 23 mg/dL Final     Assessment/Plan: Ordered cefazolin 2g IVP x 1 dose pre-op 4/18.    Pharmacy will continue to follow.  We appreciate the opportunity to assist in the care of this patient.    Thank you,   Matt Kidd, PharmD  4/18/2024  1:24 PM
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
## 2024-04-18 NOTE — ANESTHESIA POSTPROCEDURE EVALUATION
Patient: Sharan Finnegan    Procedure Summary       Date: 04/18/24 Room / Location: [facility] MAIN OR  / [facility] MAIN OR    Anesthesia Start: 1409 Anesthesia Stop: 1451    Procedure: Cystoscopy transurethral resection of bladder tumor (Bladder) Diagnosis:       Bladder tumor      (Bladder tumor [D49.4])    Surgeons: Maggie Alvarado MD Anesthesiologist: Richie Hughes MD    Anesthesia Type: general ASA Status: 3            Anesthesia Type: general    Vitals Value Taken Time   /95 04/18/24 1449   Temp 97.9 04/18/24 1451   Pulse 83 04/18/24 1450   Resp 13 04/18/24 1450   SpO2 95 % 04/18/24 1450   Vitals shown include unfiled device data.    [facility] AN Post Evaluation:   Patient Evaluated in PACU  Patient Participation: complete - patient participated  Level of Consciousness: awake  Pain Score: 0  Pain Management: adequate  Airway Patency:patent  Dental exam unchanged from preop  Yes    Nausea/Vomiting: none  Cardiovascular Status: acceptable  Respiratory Status: acceptable  Postoperative Hydration acceptable      Richie Hughes MD  4/18/2024 2:51 PM

## 2024-04-18 NOTE — ANESTHESIA PROCEDURE NOTES
Airway  Date/Time: 4/18/2024 2:15 PM  Urgency: elective    Difficult airway    General Information and Staff    Patient location during procedure: OR  Anesthesiologist: Richie Hughes MD  Performed: anesthesiologist   Performed by: Richie Hughes MD  Authorized by: Richie Hughes MD      Indications and Patient Condition  Indications for airway management: anesthesia  Sedation level: deep  Preoxygenated: yes  Patient position: sniffing  MILS maintained throughout  Mask difficulty assessment: 1 - vent by mask    Final Airway Details  Final airway type: endotracheal airway      Successful airway: ETT  Cuffed: yes   Successful intubation technique: Video laryngoscopy  Facilitating devices/methods: intubating stylet and cricoid pressure  Endotracheal tube insertion site: oral  Blade type: Priori Data video laryngoscope.  Blade size: #4  ETT size (mm): 7.5    Cormack-Lehane Classification: grade IIA - partial view of glottis  Placement verified by: capnometry   Measured from: lips  ETT to lips (cm): 24  Number of attempts at approach: 1

## 2024-04-23 ENCOUNTER — TELEPHONE (OUTPATIENT)
Dept: SURGERY | Facility: CLINIC | Age: 54
End: 2024-04-23

## 2024-04-23 ENCOUNTER — TELEPHONE (OUTPATIENT)
Dept: HEMATOLOGY/ONCOLOGY | Facility: HOSPITAL | Age: 54
End: 2024-04-23

## 2024-04-23 DIAGNOSIS — C67.9 MALIGNANT NEOPLASM OF URINARY BLADDER, UNSPECIFIED SITE (HCC): Primary | ICD-10-CM

## 2024-04-23 NOTE — TELEPHONE ENCOUNTER
----- Message from Maggie Alvarado MD sent at 4/19/2024 10:35 AM CDT -----  Please set up cysto In 3 months with cytology prior. Needs BCG in may 2024.

## 2024-04-23 NOTE — TELEPHONE ENCOUNTER
Tried to reach pt and had to LMTCB for results and to schd cysto in 3 mo and he needs BCG in May, Im guessing 3 TX's, AR did not specify. He probably doesn't need the O/V on 4/29 for path results either.       PA enct of 4/9/24 states No PA required for BCG.

## 2024-04-25 NOTE — TELEPHONE ENCOUNTER
I called the patient and confirmed his full name and . I made an appointment for the patient's cysto and placed an order for a cytology and asked the patient to go to the lab the Monday before the cysto. Pt verbalized his understanding.     Pt was in the process of calling [facility] to schedule his BCG treatments. I explained that for now the treatments will be done at [facility] due to our shortage of nurses. Pt verbalized his understanding.

## 2024-05-06 ENCOUNTER — TELEPHONE (OUTPATIENT)
Dept: SURGERY | Facility: CLINIC | Age: 54
End: 2024-05-06

## 2024-05-06 ENCOUNTER — LAB ENCOUNTER (OUTPATIENT)
Dept: LAB | Facility: HOSPITAL | Age: 54
End: 2024-05-06
Attending: UROLOGY
Payer: COMMERCIAL

## 2024-05-06 DIAGNOSIS — C67.9 MALIGNANT NEOPLASM OF URINARY BLADDER, UNSPECIFIED SITE (HCC): ICD-10-CM

## 2024-05-06 LAB
BILIRUB UR QL: NEGATIVE
CLARITY UR: CLEAR
GLUCOSE UR-MCNC: >1000 MG/DL
KETONES UR-MCNC: NEGATIVE MG/DL
LEUKOCYTE ESTERASE UR QL STRIP.AUTO: 250
NITRITE UR QL STRIP.AUTO: NEGATIVE
PH UR: 5 [PH] (ref 5–8)
RBC #/AREA URNS AUTO: >10 /HPF
SP GR UR STRIP: 1.02 (ref 1–1.03)
UROBILINOGEN UR STRIP-ACNC: NORMAL

## 2024-05-06 PROCEDURE — 87086 URINE CULTURE/COLONY COUNT: CPT

## 2024-05-06 PROCEDURE — 81001 URINALYSIS AUTO W/SCOPE: CPT

## 2024-05-06 NOTE — TELEPHONE ENCOUNTER
- received the message below on secure chat from Eleni GARCIA RN from the [facility] cancer center.    \"hi there. this patient is coming to the cancer center tomorrow for BCG instillation and UA is showing bacteria, leukocytes and RBCs. we need an ok from the MD if they would like us to still proceed with the Bcg instillation\"     I reviewed the Urine test results which did not reflex to cuture.  Consulted with AR, and per AR, OK to proceed with instillation.  This was communicated to Eleni LANCASTER RN via secure chat, and acknowledged.    Urinalysis with Culture Reflex  Order: [id number]   Collected 5/6/2024  6:32 AM       Status: Final result       Dx: Malignant neoplasm of urinary bladder...    Specimen Information: Urine, clean catch   0 Result Notes      Component  Ref Range & Units 5/6/24  6:32 AM   Urine Color  Yellow Light-Yellow   Clarity Urine  Clear Clear   Spec Gravity  1.005 - 1.030 1.023   Glucose Urine  Normal mg/dL >1000 Abnormal    Bilirubin Urine  Negative Negative   Ketones Urine  Negative mg/dL Negative   Blood Urine  Negative 3+ Abnormal    pH Urine  5.0 - 8.0 5.0   Protein Urine  Negative mg/dL Trace Abnormal    Urobilinogen Urine  Normal Normal   Nitrite Urine  Negative Negative   Leukocyte Esterase Urine  Negative 250 Abnormal    Comment:  Genaro/uL Interpretation  25          Trace  75          1+  250         2+  500         3+   WBC Urine  0 - 5 /HPF 21-50 Abnormal    RBC Urine  0 - 2 /HPF >10 Abnormal    Bacteria Urine  None Seen /HPF Rare Abnormal    Squamous Epi. Cells  None Seen /HPF Few Abnormal    Renal Tubular Epithelial Cells  None Seen /HPF None Seen   Transitional Cells  None Seen /HPF None Seen   Yeast Urine  None Seen /HPF None Seen   Resulting Agency [facility]              Specimen Collected: 05/06/24  6:32 AM Last Resulted: 05/06/24  7:48 AM         .

## 2024-05-07 ENCOUNTER — OFFICE VISIT (OUTPATIENT)
Dept: HEMATOLOGY/ONCOLOGY | Age: 54
End: 2024-05-07
Attending: UROLOGY
Payer: COMMERCIAL

## 2024-05-07 VITALS
DIASTOLIC BLOOD PRESSURE: 85 MMHG | BODY MASS INDEX: 40 KG/M2 | SYSTOLIC BLOOD PRESSURE: 130 MMHG | WEIGHT: 258 LBS | TEMPERATURE: 98 F | RESPIRATION RATE: 18 BRPM | OXYGEN SATURATION: 95 % | HEART RATE: 83 BPM

## 2024-05-07 DIAGNOSIS — C67.0 MALIGNANT NEOPLASM OF TRIGONE OF URINARY BLADDER (HCC): Primary | ICD-10-CM

## 2024-05-07 PROCEDURE — 51720 TREATMENT OF BLADDER LESION: CPT

## 2024-05-07 RX ORDER — LIDOCAINE HYDROCHLORIDE 20 MG/ML
10 JELLY TOPICAL ONCE
Start: 2024-06-03 | End: 2024-06-03

## 2024-05-07 RX ORDER — LIDOCAINE HYDROCHLORIDE 20 MG/ML
10 JELLY TOPICAL ONCE
Status: COMPLETED | OUTPATIENT
Start: 2024-05-07 | End: 2024-05-07

## 2024-05-07 RX ADMIN — LIDOCAINE HYDROCHLORIDE 10 ML: 20 JELLY TOPICAL at 14:05:00

## 2024-05-07 NOTE — PROGRESS NOTES
After lidocaine jelly instilled into urethra 15Fr straight cath inserted into bladder using sterile technique without difficulty.  About 5mL clear, yellow urine drained from bladder.  instilled into bladder and cath removed.  Pt  was instructed that pt needs to hold his urine for 2 hours and ambulate as much as possible.  If he doesn't want to ambulate he may lay down and rotate positions every 15min.  After the 2 hours has passed, pt to urinate into toilet and then pour bleach into water and let it stand for 15-20 min.  Then pt to flush toilet twice.  Pt to contact urologist's office for any issues/concerns. Pt verbalized understanding.     Per surendra Brandt to proceed with BCG with UA results.

## 2024-05-13 ENCOUNTER — LAB ENCOUNTER (OUTPATIENT)
Dept: LAB | Facility: HOSPITAL | Age: 54
End: 2024-05-13
Attending: FAMILY MEDICINE

## 2024-05-13 DIAGNOSIS — C67.9 MALIGNANT NEOPLASM OF URINARY BLADDER, UNSPECIFIED SITE (HCC): ICD-10-CM

## 2024-05-13 LAB
BILIRUB UR QL: NEGATIVE
CLARITY UR: CLEAR
GLUCOSE UR-MCNC: 500 MG/DL
LEUKOCYTE ESTERASE UR QL STRIP.AUTO: 250
NITRITE UR QL STRIP.AUTO: NEGATIVE
PH UR: 5.5 [PH] (ref 5–8)
RBC #/AREA URNS AUTO: >10 /HPF
SP GR UR STRIP: 1.03 (ref 1–1.03)
UROBILINOGEN UR STRIP-ACNC: NORMAL

## 2024-05-13 PROCEDURE — 87086 URINE CULTURE/COLONY COUNT: CPT

## 2024-05-13 PROCEDURE — 81001 URINALYSIS AUTO W/SCOPE: CPT

## 2024-05-14 ENCOUNTER — OFFICE VISIT (OUTPATIENT)
Dept: HEMATOLOGY/ONCOLOGY | Age: 54
End: 2024-05-14
Attending: UROLOGY

## 2024-05-14 ENCOUNTER — TELEPHONE (OUTPATIENT)
Dept: HEMATOLOGY/ONCOLOGY | Facility: HOSPITAL | Age: 54
End: 2024-05-14

## 2024-05-14 VITALS
HEART RATE: 95 BPM | BODY MASS INDEX: 40 KG/M2 | WEIGHT: 255 LBS | SYSTOLIC BLOOD PRESSURE: 146 MMHG | RESPIRATION RATE: 16 BRPM | OXYGEN SATURATION: 96 % | DIASTOLIC BLOOD PRESSURE: 91 MMHG | TEMPERATURE: 98 F

## 2024-05-14 DIAGNOSIS — C67.0 MALIGNANT NEOPLASM OF TRIGONE OF URINARY BLADDER (HCC): Primary | ICD-10-CM

## 2024-05-14 PROCEDURE — 51720 TREATMENT OF BLADDER LESION: CPT

## 2024-05-14 RX ORDER — LIDOCAINE HYDROCHLORIDE 20 MG/ML
10 JELLY TOPICAL ONCE
Status: COMPLETED | OUTPATIENT
Start: 2024-05-14 | End: 2024-05-14

## 2024-05-14 RX ORDER — LIDOCAINE HYDROCHLORIDE 20 MG/ML
10 JELLY TOPICAL ONCE
Start: 2024-06-11 | End: 2024-06-11

## 2024-05-14 RX ADMIN — LIDOCAINE HYDROCHLORIDE 10 ML: 20 JELLY TOPICAL at 14:18:00

## 2024-05-14 NOTE — TELEPHONE ENCOUNTER
Attempted to contact Ginny Gonzales RN and Dr Alvarado and on 5/13/24 at 3pm and 5/14 /24 am for an ok to treat with BCG based on 5/13/24 urine results. No response received.  Office called at [phone] 5/14/24  and message left via perfect serve.  Received call from Dr Alvarado with an order, ok to treat.

## 2024-05-14 NOTE — PROGRESS NOTES
Pt here for BCG Instillation. Pt denies any issues or concerns.      Ordering MD: Jenny  Order Exp: 1 of 3 remain     Pt tolerated infusion without difficulty or complaint. Reviewed next apt date/time: yes, 5/21      Education Record  Learner:  Patient  Disease / Diagnosis: Bladder cancer  Barriers / Limitations:  None  Method:  Brief focused and Discussion  General Topics:  Medication, Side effects and symptom management, and Plan of care reviewed  Outcome:  Shows understanding      Urine reviewed with Dr Alvarado. Ok to proceed with BCG today. Lidocaine jelly instilled into urethra.  15 Fr straight catheter inserted into bladder using sterile technique without difficulty. 20 ml yellow urine drained. BCG instilled and catheter removed. Pt instructed to hold urine for 2 hours and ambulate or change positions as much as possible.pt instructed to call MD with any questions or concerns. Discharged home in stable condition, no new complaints.

## 2024-05-20 ENCOUNTER — LAB ENCOUNTER (OUTPATIENT)
Dept: LAB | Facility: HOSPITAL | Age: 54
End: 2024-05-20
Attending: UROLOGY

## 2024-05-20 DIAGNOSIS — C67.9 MALIGNANT NEOPLASM OF URINARY BLADDER, UNSPECIFIED SITE (HCC): ICD-10-CM

## 2024-05-20 LAB
BILIRUB UR QL: NEGATIVE
CLARITY UR: CLEAR
GLUCOSE UR-MCNC: NORMAL MG/DL
HGB UR QL STRIP.AUTO: NEGATIVE
KETONES UR-MCNC: NEGATIVE MG/DL
LEUKOCYTE ESTERASE UR QL STRIP.AUTO: 250
NITRITE UR QL STRIP.AUTO: NEGATIVE
PH UR: 5.5 [PH] (ref 5–8)
SP GR UR STRIP: 1.02 (ref 1–1.03)
UROBILINOGEN UR STRIP-ACNC: NORMAL

## 2024-05-20 PROCEDURE — 81001 URINALYSIS AUTO W/SCOPE: CPT

## 2024-05-20 PROCEDURE — 87086 URINE CULTURE/COLONY COUNT: CPT

## 2024-05-21 ENCOUNTER — OFFICE VISIT (OUTPATIENT)
Dept: HEMATOLOGY/ONCOLOGY | Age: 54
End: 2024-05-21
Attending: UROLOGY

## 2024-05-21 VITALS
DIASTOLIC BLOOD PRESSURE: 80 MMHG | HEART RATE: 85 BPM | BODY MASS INDEX: 39 KG/M2 | OXYGEN SATURATION: 94 % | TEMPERATURE: 99 F | RESPIRATION RATE: 18 BRPM | SYSTOLIC BLOOD PRESSURE: 117 MMHG | WEIGHT: 249 LBS

## 2024-05-21 DIAGNOSIS — C67.0 MALIGNANT NEOPLASM OF TRIGONE OF URINARY BLADDER (HCC): Primary | ICD-10-CM

## 2024-05-21 PROCEDURE — 51720 TREATMENT OF BLADDER LESION: CPT

## 2024-05-21 RX ORDER — LIDOCAINE HYDROCHLORIDE 20 MG/ML
10 JELLY TOPICAL ONCE
Status: COMPLETED | OUTPATIENT
Start: 2024-05-21 | End: 2024-05-21

## 2024-05-21 RX ORDER — LIDOCAINE HYDROCHLORIDE 20 MG/ML
10 JELLY TOPICAL ONCE
Status: CANCELLED
Start: 2024-06-18 | End: 2024-06-18

## 2024-05-21 RX ADMIN — LIDOCAINE HYDROCHLORIDE 10 ML: 20 JELLY TOPICAL at 13:52:00

## 2024-05-21 NOTE — PROGRESS NOTES
After lidocaine jelly instilled into urethra 15Fr straight cath inserted into bladder using sterile technique without difficulty.  About 20mL clear, yellow urine drained from bladder.  instilled into bladder and cath removed.  Pt and family was instructed that pt needs to hold his urine for 2 hours and ambulate as much as possible.  If he doesn't want to ambulate he may lay down and rotate positions every 15min.  After the 2 hours has passed, pt to urinate into toilet and then pour bleach into water and let it stand for 15-20 min.  Then pt to flush toilet twice.  Pt to contact urologist's office for any issues/concerns. Pt and family verbalized understanding.

## 2024-07-24 ENCOUNTER — LAB ENCOUNTER (OUTPATIENT)
Dept: LAB | Facility: HOSPITAL | Age: 54
End: 2024-07-24
Attending: INTERNAL MEDICINE
Payer: COMMERCIAL

## 2024-07-24 DIAGNOSIS — R89.1 ABNORMAL LEVEL OF HORMONES IN SPECIMENS FROM OTH ORG/TISS: ICD-10-CM

## 2024-07-24 DIAGNOSIS — C68.9 MALIGNANT NEOPLASM OF URINARY ORGAN (HCC): Primary | ICD-10-CM

## 2024-07-24 PROCEDURE — 84403 ASSAY OF TOTAL TESTOSTERONE: CPT

## 2024-07-24 PROCEDURE — 36415 COLL VENOUS BLD VENIPUNCTURE: CPT

## 2024-07-24 PROCEDURE — 84402 ASSAY OF FREE TESTOSTERONE: CPT

## 2024-07-25 DIAGNOSIS — E13.9 DIABETES 1.5, MANAGED AS TYPE 2 (HCC): Primary | ICD-10-CM

## 2024-07-26 LAB
FREE TESTOST DIRECT: 3.9 PG/ML
TESTOSTERONE: 73 NG/DL

## 2024-07-31 ENCOUNTER — TELEPHONE (OUTPATIENT)
Dept: SURGERY | Facility: CLINIC | Age: 54
End: 2024-07-31

## 2024-07-31 ENCOUNTER — PROCEDURE (OUTPATIENT)
Dept: SURGERY | Facility: CLINIC | Age: 54
End: 2024-07-31
Payer: COMMERCIAL

## 2024-07-31 VITALS — DIASTOLIC BLOOD PRESSURE: 72 MMHG | HEART RATE: 72 BPM | SYSTOLIC BLOOD PRESSURE: 110 MMHG

## 2024-07-31 DIAGNOSIS — C67.9 MALIGNANT NEOPLASM OF URINARY BLADDER, UNSPECIFIED SITE (HCC): Primary | ICD-10-CM

## 2024-07-31 PROCEDURE — 52000 CYSTOURETHROSCOPY: CPT | Performed by: UROLOGY

## 2024-07-31 PROCEDURE — 3078F DIAST BP <80 MM HG: CPT | Performed by: UROLOGY

## 2024-07-31 PROCEDURE — 3074F SYST BP LT 130 MM HG: CPT | Performed by: UROLOGY

## 2024-07-31 NOTE — TELEPHONE ENCOUNTER
Pt has been scheduled to start BCG maintenance x3 on 08/14/24. Pt has [employer] PPO insurance, please advise if prior auth is needed.

## 2024-07-31 NOTE — PROCEDURES
CYSTOSCOPY (MALE)    PRE-OP DIAGNOSIS: HgT1/CIS    POST-OP DIAGNOSIS: same    PROCEDURE: Cystsocopy    SURGEON: Maggie Alvarado MD    ASSISTANT: none cystoscopy    EBL: minimal    FINDINGS:   Urethra: No meatal stenosis, no anterior or posterior urethral strictures, open bladder neck   Prostate: Bilobar coaptation with mild intravesical component   Bladder: Bilateral ureteral orifices in orthotopic position with efflux, no suspicious or concerning erythematous lesions, no papillary bladder tumors, no stones   Retroflexion: Mild intravesical component of prostate   Other findings:     INDICATIONS: HgT1 in 2023. Repeat TUR with HgTa, T1 and CIS. Offered cystectomy, declined. Underwent BCG. First surveillance cystoscopy with HgTa recurrence. He underwent re-induction BCG. Had TURBT which was negative. Had maintenance BCG #1 May 2024    PROCEDURE: Patient was brought to the procedure suite and a timeout was performed identifying the patient,  and procedure being performed.  The risks of the procedure were once again detailed to the patient including bleeding, infection, dysuria.  The patient agreed to proceed.  The patient had a negative urinalysis.  No antibiotics were given to patient prior to this procedure.     He was placed in a supine position on the table and a flexible cystoscope was inserted per urethra.  There were no obvious urethral strictures in the anterior, membranous or posterior urethra.  The prostate appeared small.  Once in the bladder we performed a full diagnostic/surveillance cystoscopy which demonstrated no abnormalities.  On retroflexion we noted no abnormalities.  The scope was then carefully removed and once again no urethral abnormalities were noted.    There were no complications after this procedure and the patient tolerated the procedure without issue.    IMPRESSION: HgT1/CIS/Ta with negative cysto. Will start maintenance BCG #2 in August.  Will need a cystoscopy in 3 months with  cytology prior.  Cytology will be sent today.  Plan will stick as long as cytology is negative    PLAN:    BCG to start in mid August x 3  Cystoscopy in 3 months with cytology prior  Cytology today

## 2024-08-01 LAB — NON GYNE INTERPRETATION: NEGATIVE

## 2024-08-02 NOTE — TELEPHONE ENCOUNTER
9, 2024  Ginny Gonzales RN MB    4/9/24  3:47 PM  Note  - Using availity, no auth required (see below).  Also printed and placed in to scanning for pt's chart.

## 2024-08-12 ENCOUNTER — LAB ENCOUNTER (OUTPATIENT)
Dept: LAB | Facility: HOSPITAL | Age: 54
End: 2024-08-12
Attending: UROLOGY
Payer: COMMERCIAL

## 2024-08-12 DIAGNOSIS — C67.9 MALIGNANT NEOPLASM OF URINARY BLADDER, UNSPECIFIED SITE (HCC): ICD-10-CM

## 2024-08-12 LAB
BILIRUB UR QL: NEGATIVE
CLARITY UR: CLEAR
GLUCOSE UR-MCNC: 500 MG/DL
LEUKOCYTE ESTERASE UR QL STRIP.AUTO: 25
NITRITE UR QL STRIP.AUTO: NEGATIVE
PH UR: 5 [PH] (ref 5–8)
PROT UR-MCNC: NEGATIVE MG/DL
SP GR UR STRIP: 1.02 (ref 1–1.03)
UROBILINOGEN UR STRIP-ACNC: NORMAL

## 2024-08-12 PROCEDURE — 87086 URINE CULTURE/COLONY COUNT: CPT

## 2024-08-12 PROCEDURE — 81001 URINALYSIS AUTO W/SCOPE: CPT

## 2024-08-14 ENCOUNTER — PROCEDURE (OUTPATIENT)
Dept: SURGERY | Facility: CLINIC | Age: 54
End: 2024-08-14
Payer: COMMERCIAL

## 2024-08-14 VITALS — SYSTOLIC BLOOD PRESSURE: 128 MMHG | RESPIRATION RATE: 18 BRPM | HEART RATE: 87 BPM | DIASTOLIC BLOOD PRESSURE: 86 MMHG

## 2024-08-14 DIAGNOSIS — D49.4 BLADDER TUMOR: ICD-10-CM

## 2024-08-14 DIAGNOSIS — C67.9 MALIGNANT NEOPLASM OF URINARY BLADDER, UNSPECIFIED SITE (HCC): Primary | ICD-10-CM

## 2024-08-14 PROCEDURE — 3074F SYST BP LT 130 MM HG: CPT | Performed by: UROLOGY

## 2024-08-14 PROCEDURE — 51720 TREATMENT OF BLADDER LESION: CPT | Performed by: PHYSICIAN ASSISTANT

## 2024-08-14 PROCEDURE — 3079F DIAST BP 80-89 MM HG: CPT | Performed by: UROLOGY

## 2024-08-14 NOTE — PROGRESS NOTES
Subjective:   Patient ID: Sharan Finnegan is a 54 year old male.    HPI  Sharan Finnegan is a 54 year old male who presents to the clinic for BCG instillation. This will be the patients 1st of 3 instillations of BCG.      History/Other:   A comprehensive 5 point review of systems was completed.  Pertinent positives and negatives noted in the the HPI.   Current Outpatient Medications   Medication Sig Dispense Refill    metFORMIN 500 MG Oral Tab Take 1 tablet (500 mg total) by mouth 2 (two) times daily with meals. 60 tablet 0    lisinopril 20 MG Oral Tab Take 1 tablet (20 mg total) by mouth daily.      Multiple Vitamins-Minerals (CENTRUM MEN OR) Take 1 tablet by mouth daily.       Allergies:No Known Allergies    Objective:   Physical Exam  Constitutional:       Appearance: Normal appearance.   HENT:      Head: Normocephalic.   Eyes:      General: No scleral icterus.     Extraocular Movements: Extraocular movements intact.   Pulmonary:      Effort: Pulmonary effort is normal. No respiratory distress.   Neurological:      Mental Status: He is alert and oriented to person, place, and time.   Psychiatric:         Mood and Affect: Mood normal.         Behavior: Behavior normal.         Assessment & Plan:   1. Malignant neoplasm of urinary bladder, unspecified site (HCC)    2. Bladder tumor          No orders of the defined types were placed in this encounter.    Sharan Finnegan is a 54 year old  male who presents to the clinic for BCG instillation. This is the 1st of 3 instillations. Urine culture from 08/12/2024 shows no growth.He denies any symptoms to suggest a UTI.       I explained that there is no contraindication to performing his BCG instillation today.  Consent has been signed.  He confirms he has bleach available at home.  14 fr catheter was placed by RN using sterile technique.  Return of clear yellow urine.  BCG half strength (25mg)  was gently instilled into the bladder, patient tolerated well.  Catheter was them  removed.  Supplies were disposed of using standard BCG protocol.         Patient will return in one week. Patient agreeable.   Meds This Visit:  Requested Prescriptions      No prescriptions requested or ordered in this encounter       Imaging & Referrals:  None    Mal Maldonado PA-C  August 14, 2024

## 2024-08-26 ENCOUNTER — LAB ENCOUNTER (OUTPATIENT)
Dept: LAB | Facility: HOSPITAL | Age: 54
End: 2024-08-26
Attending: UROLOGY
Payer: COMMERCIAL

## 2024-08-26 DIAGNOSIS — C67.9 MALIGNANT NEOPLASM OF URINARY BLADDER, UNSPECIFIED SITE (HCC): ICD-10-CM

## 2024-08-26 LAB
BILIRUB UR QL: NEGATIVE
CLARITY UR: CLEAR
GLUCOSE UR-MCNC: 50 MG/DL
KETONES UR-MCNC: NEGATIVE MG/DL
LEUKOCYTE ESTERASE UR QL STRIP.AUTO: 25
NITRITE UR QL STRIP.AUTO: NEGATIVE
PH UR: 5.5 [PH] (ref 5–8)
PROT UR-MCNC: NEGATIVE MG/DL
SP GR UR STRIP: 1.02 (ref 1–1.03)
UROBILINOGEN UR STRIP-ACNC: NORMAL

## 2024-08-26 PROCEDURE — 87086 URINE CULTURE/COLONY COUNT: CPT

## 2024-08-26 PROCEDURE — 81001 URINALYSIS AUTO W/SCOPE: CPT

## 2024-08-28 ENCOUNTER — PROCEDURE (OUTPATIENT)
Dept: SURGERY | Facility: CLINIC | Age: 54
End: 2024-08-28
Payer: COMMERCIAL

## 2024-08-28 VITALS — SYSTOLIC BLOOD PRESSURE: 129 MMHG | DIASTOLIC BLOOD PRESSURE: 80 MMHG | RESPIRATION RATE: 16 BRPM | HEART RATE: 90 BPM

## 2024-08-28 DIAGNOSIS — C67.9 MALIGNANT NEOPLASM OF URINARY BLADDER, UNSPECIFIED SITE (HCC): Primary | ICD-10-CM

## 2024-08-28 PROCEDURE — 51720 TREATMENT OF BLADDER LESION: CPT | Performed by: PHYSICIAN ASSISTANT

## 2024-08-28 NOTE — PROGRESS NOTES
I determined that pt's urine test results were acceptable and that NO PA was required. I gathered my supplies and called the pt into the exam room and I verified his name and . I explained that I will be placing the cath for his BCG 25 mg # 2 of 3 and AKD will be instilling the med. Pt agreed to proceed. I then asked the pt to step up onto the exam table and lower his bottom clothing to his ankles. I then placed the pt in supine position on the exam table and proceeded to prep and drape the pt in sterile fashion and I proceeded to instill about 2/3 of the Urojet (Lidocaine HCL 2 %- 10 ml ) into the pt's urethra and I then constricted the head of the penis for about 1-2 min to allow sufficient numbing. I then proceeded to insert a 14 Fr coude tipped catheter into the bladder and received about 30 ml of rosalio colored urine. I went out of the room to get AKD  and we returned to the room together and we then conducted the timeout and then AKD instilled the BCG 25 mg without incident and pt tolerated it well. AKD then removed the catheter and all of the chemo waste was disposed of into the yellow chemo bag. I gave pt wipes to clean up and told him to redress and that I would return in a minute. I disposed of the chemo bag into the yellow chemo bucket in the dirty utility room. I went back to the room where the pt was and reviewed the BCG aftercare instructions. I asked pt to return next week for his next TX and to remember to submit his urine sample at the lab on  as the clinic is closed on Monday d/t the holiday. Pt verbalized understanding and compliance and I sent him on his way. I then cleaned the exam room with the orange topped bleach wipes as per protocol

## 2024-08-28 NOTE — PROGRESS NOTES
Subjective:   Patient ID: Sharan Finnegan is a 54 year old male.    HPI  Sharan Finnegan is a 54 year old male who presents to the clinic for BCG instillation. This will be the patients 2nd of 3 instillations of BCG.      History/Other:   A comprehensive 5 point review of systems was completed.  Pertinent positives and negatives noted in the the HPI.   Current Outpatient Medications   Medication Sig Dispense Refill    metFORMIN 500 MG Oral Tab Take 1 tablet (500 mg total) by mouth 2 (two) times daily with meals. 60 tablet 0    lisinopril 20 MG Oral Tab Take 1 tablet (20 mg total) by mouth daily.      Multiple Vitamins-Minerals (CENTRUM MEN OR) Take 1 tablet by mouth daily.       Allergies:No Known Allergies    Objective:   Physical Exam  Constitutional:       Appearance: Normal appearance.   HENT:      Head: Normocephalic.   Eyes:      General: No scleral icterus.     Extraocular Movements: Extraocular movements intact.   Pulmonary:      Effort: Pulmonary effort is normal. No respiratory distress.   Neurological:      Mental Status: He is alert and oriented to person, place, and time.   Psychiatric:         Mood and Affect: Mood normal.         Behavior: Behavior normal.         Assessment & Plan:   No diagnosis found.        No orders of the defined types were placed in this encounter.    Sharan Finnegan is a 54 year old  male who presents to the clinic for BCG instillation. This is the 2ndof 3 instillations. Urine culture from 08/26/2024 shows no growth.He denies any symptoms to suggest a UTI.       I explained that there is no contraindication to performing his BCG instillation today.  Consent has been signed.  He confirms he has bleach available at home.  14 fr catheter was placed by RN using sterile technique.  Return of clear yellow urine.  BCG half strength (25mg)  was gently instilled into the bladder, patient tolerated well.  Catheter was them removed.  Supplies were disposed of using standard BCG protocol.          Patient will return in one week. Patient agreeable.   Meds This Visit:  Requested Prescriptions      No prescriptions requested or ordered in this encounter       Imaging & Referrals:  None    Mal Maldonado PA-C  August 28, 2024

## 2024-09-03 ENCOUNTER — LAB ENCOUNTER (OUTPATIENT)
Dept: LAB | Facility: HOSPITAL | Age: 54
End: 2024-09-03
Attending: UROLOGY
Payer: COMMERCIAL

## 2024-09-03 ENCOUNTER — TELEPHONE (OUTPATIENT)
Dept: SURGERY | Facility: CLINIC | Age: 54
End: 2024-09-03

## 2024-09-03 DIAGNOSIS — C67.9 MALIGNANT NEOPLASM OF URINARY BLADDER, UNSPECIFIED SITE (HCC): ICD-10-CM

## 2024-09-03 LAB
BILIRUB UR QL: NEGATIVE
CLARITY UR: CLEAR
GLUCOSE UR-MCNC: 100 MG/DL
HGB UR QL STRIP.AUTO: NEGATIVE
KETONES UR-MCNC: NEGATIVE MG/DL
LEUKOCYTE ESTERASE UR QL STRIP.AUTO: 75
NITRITE UR QL STRIP.AUTO: NEGATIVE
PH UR: 5.5 [PH] (ref 5–8)
SP GR UR STRIP: 1.03 (ref 1–1.03)
UROBILINOGEN UR STRIP-ACNC: NORMAL

## 2024-09-03 PROCEDURE — 81001 URINALYSIS AUTO W/SCOPE: CPT

## 2024-09-03 PROCEDURE — 87086 URINE CULTURE/COLONY COUNT: CPT

## 2024-09-03 NOTE — TELEPHONE ENCOUNTER
Pt called back I verified name/ I informed pt that urine culture is pending. Pt does not have any symptoms of a UTI at this time denies freq, denies burning so I informed pt to follow up as scheduled for his scheduled BCG treatment. Pt to call if any issues. Call ended.

## 2024-09-03 NOTE — TELEPHONE ENCOUNTER
[facility]    Patient is scheduled for BCG this week, find out if patient has any UTI symptoms. Urine culture is pending and won't have final results until Thursday. Per Dr. Alvarado can proceed with BCG as long as patient doesn't have any UTI symptoms.

## 2024-09-04 ENCOUNTER — PROCEDURE (OUTPATIENT)
Dept: SURGERY | Facility: CLINIC | Age: 54
End: 2024-09-04

## 2024-09-04 VITALS — HEART RATE: 77 BPM | SYSTOLIC BLOOD PRESSURE: 131 MMHG | DIASTOLIC BLOOD PRESSURE: 82 MMHG

## 2024-09-04 DIAGNOSIS — C67.9 MALIGNANT NEOPLASM OF URINARY BLADDER, UNSPECIFIED SITE (HCC): Primary | ICD-10-CM

## 2024-09-04 PROCEDURE — 3079F DIAST BP 80-89 MM HG: CPT | Performed by: PHYSICIAN ASSISTANT

## 2024-09-04 PROCEDURE — 51720 TREATMENT OF BLADDER LESION: CPT | Performed by: PHYSICIAN ASSISTANT

## 2024-09-04 PROCEDURE — 3075F SYST BP GE 130 - 139MM HG: CPT | Performed by: PHYSICIAN ASSISTANT

## 2024-09-04 NOTE — PROGRESS NOTES
-Pt presents to Urology for #3/3 BCG Bladder installation; identity verified with name & .  -Pt positions self on exam table; draped for privacy to lower shorts to mid-thigh; penis tip prepped w/ betadine; instill 2% lido-gel 2-3 minutes for pain management; insert 14Fr coude with urine return; Timeout/ BCG installation/ catheter removal per MARIANNA KEATING; pt redressed self.  -Pt aware Cysto/ BTS scheduled 24.  -Encounter complete.

## 2024-09-04 NOTE — PROGRESS NOTES
Subjective:   Patient ID: Sharan Finnegan is a 54 year old male.    HPI  Sharan Finnegan is a 54 year old male who presents to the clinic for BCG instillation. This will be the patients 3rd of 3 instillations of BCG.      History/Other:   A comprehensive 5 point review of systems was completed.  Pertinent positives and negatives noted in the the HPI.   Current Outpatient Medications   Medication Sig Dispense Refill    metFORMIN 500 MG Oral Tab Take 1 tablet (500 mg total) by mouth 2 (two) times daily with meals. 60 tablet 0    lisinopril 20 MG Oral Tab Take 1 tablet (20 mg total) by mouth daily.      Multiple Vitamins-Minerals (CENTRUM MEN OR) Take 1 tablet by mouth daily.       Allergies:No Known Allergies    Objective:   Physical Exam  Constitutional:       Appearance: Normal appearance.   HENT:      Head: Normocephalic.   Eyes:      General: No scleral icterus.     Extraocular Movements: Extraocular movements intact.   Pulmonary:      Effort: Pulmonary effort is normal. No respiratory distress.   Neurological:      Mental Status: He is alert and oriented to person, place, and time.   Psychiatric:         Mood and Affect: Mood normal.         Behavior: Behavior normal.         Assessment & Plan:   1. Malignant neoplasm of urinary bladder, unspecified site (HCC)            No orders of the defined types were placed in this encounter.    Sharan Finnegan is a 54 year old  male who presents to the clinic for BCG instillation. This is the 3rd of 3 instillations. UA without nitrites, reflex urine culture pending. He denies any symptoms to suggest a UTI.       I explained that there is no contraindication to performing his BCG instillation today.  Consent has been signed.  He confirms he has bleach available at home.  14 fr catheter was placed by RN using sterile technique.  Return of clear yellow urine.  BCG half strength (25mg)  was gently instilled into the bladder, patient tolerated well.  Catheter was them removed.   Supplies were disposed of using standard BCG protocol.         Patient will return in one week. Patient agreeable.   Meds This Visit:  Requested Prescriptions      No prescriptions requested or ordered in this encounter       Imaging & Referrals:  None    Mal Maldonado PA-C  September 04, 2024

## 2024-10-17 ENCOUNTER — TELEPHONE (OUTPATIENT)
Dept: SURGERY | Facility: CLINIC | Age: 54
End: 2024-10-17

## 2024-10-17 NOTE — TELEPHONE ENCOUNTER
[phone]  -On 11/4/24, pt scheduled for Cysto/ BTS w/ [facility]; while AR is on FMLA.   -While writing this TE, pt called back; agrees to RS 11/8/24 & arrive @ 12N.  -Encounter complete.

## 2024-11-06 ENCOUNTER — LAB ENCOUNTER (OUTPATIENT)
Dept: LAB | Facility: HOSPITAL | Age: 54
End: 2024-11-06
Attending: UROLOGY
Payer: COMMERCIAL

## 2024-11-06 DIAGNOSIS — C67.9 MALIGNANT NEOPLASM OF URINARY BLADDER, UNSPECIFIED SITE (HCC): ICD-10-CM

## 2024-11-06 LAB
BILIRUB UR QL: NEGATIVE
CLARITY UR: CLEAR
GLUCOSE UR-MCNC: NORMAL MG/DL
KETONES UR-MCNC: NEGATIVE MG/DL
LEUKOCYTE ESTERASE UR QL STRIP.AUTO: 25
NITRITE UR QL STRIP.AUTO: NEGATIVE
PH UR: 5 [PH] (ref 5–8)
RBC #/AREA URNS AUTO: >10 /HPF
SP GR UR STRIP: 1.03 (ref 1–1.03)
UROBILINOGEN UR STRIP-ACNC: NORMAL

## 2024-11-06 PROCEDURE — 81001 URINALYSIS AUTO W/SCOPE: CPT

## 2024-11-06 PROCEDURE — 87086 URINE CULTURE/COLONY COUNT: CPT

## 2024-11-08 ENCOUNTER — PROCEDURE (OUTPATIENT)
Dept: SURGERY | Facility: CLINIC | Age: 54
End: 2024-11-08
Payer: COMMERCIAL

## 2024-11-08 DIAGNOSIS — C67.9 MALIGNANT NEOPLASM OF URINARY BLADDER, UNSPECIFIED SITE (HCC): Primary | ICD-10-CM

## 2024-11-08 PROCEDURE — 52000 CYSTOURETHROSCOPY: CPT | Performed by: UROLOGY

## 2024-11-08 PROCEDURE — 88108 CYTOPATH CONCENTRATE TECH: CPT | Performed by: UROLOGY

## 2024-11-08 PROCEDURE — 99213 OFFICE O/P EST LOW 20 MIN: CPT | Performed by: UROLOGY

## 2024-11-08 RX ORDER — CIPROFLOXACIN 500 MG/1
500 TABLET, FILM COATED ORAL ONCE
Status: SHIPPED | OUTPATIENT
Start: 2024-11-08

## 2024-11-08 NOTE — PROGRESS NOTES
Sharan Finnegan is a 54 year old male.    HPI:     Chief Complaint   Patient presents with    Procedure     PT presents for cystoscopy       54-year-old male patient of Dr. Alvarado with a history of high-grade T1 bladder cancer originally diagnosed April 2023.  Has had a number of recurrences.  Had BCG x 6 for induction twice as well as 3 cycles of maintenance BCG most recently September 2024 presenting for surveillance office cystoscopy.  His last cystoscopy with Dr. Alvarado July 2024 was unremarkable.  He denies any gross hematuria or dysuria.  Most recent upper tract imaging April 2023 showing nonobstructing left-sided kidney stones without hydronephrosis.    HISTORY:  Past Medical History:    Calculus of kidney    Cancer (HCC)    bladder    Diabetes (HCC)    High blood pressure    Visual impairment    glasses      Past Surgical History:   Procedure Laterality Date    Fracture surgery Right     arm    Other surgical history Right     biceps muscle torn      Family History   Problem Relation Age of Onset    Hypertension Father     Cancer Mother     Hypertension Mother     Hypertension Brother       Social History:   Social History     Socioeconomic History    Marital status:    Tobacco Use    Smoking status: Former     Current packs/day: 1.50     Average packs/day: 1.5 packs/day for 35.0 years (52.5 ttl pk-yrs)     Types: Cigarettes    Smokeless tobacco: Never   Vaping Use    Vaping status: Never Used   Substance and Sexual Activity    Alcohol use: Not Currently    Drug use: Not Currently     Social Drivers of Health     Financial Resource Strain: Low Risk  (6/9/2023)    Financial Resource Strain     Difficulty of Paying Living Expenses: Not very hard     Med Affordability: No   Transportation Needs: No Transportation Needs (6/9/2023)    Transportation Needs     Lack of Transportation: No        Medications (Active prior to today's visit):  Current Outpatient Medications   Medication Sig Dispense Refill     metFORMIN 500 MG Oral Tab Take 1 tablet (500 mg total) by mouth 2 (two) times daily with meals. 60 tablet 0    lisinopril 20 MG Oral Tab Take 1 tablet (20 mg total) by mouth daily.      Multiple Vitamins-Minerals (CENTRUM MEN OR) Take 1 tablet by mouth daily.         Allergies:  Allergies[1]      ROS:       PHYSICAL EXAM:   Sharanjanae Laiyolanda  : 1970  Referring Physician: No ref. provider found     Chief Complaint   Patient presents with    Procedure     PT presents for cystoscopy           CYSTOSCOPY    Anesthesia:  2% lidocaine gel    Urethra: Normal  Prostate / Pelvic: Normal  Bladder: Abnormal no significant abnormalities noted.  There is erythema and a minimal scale in the previous scar area along the right posterior lateral bladder wall an area measuring 0.5 cm.  There is no sessile or papillary features to it and pictures were taken.  The area almost appears like an organized scar from her previous TUR.  Pictures were taken and submitted into the chart.  The bladder otherwise demonstrates no abnormalities. .  No tumor, stone, diverticulum, or glomerulation  U.O's: Normal  Trabeculation: grade 2      POST CYSTOSCOPY MEDICATIONS: sample one tablet Cipro 500mg given to patient    DIAGNOSIS:     PLAN: see below       ASSESSMENT/PLAN:   Assessment   Encounter Diagnosis   Name Primary?    Malignant neoplasm of urinary bladder, unspecified site (HCC) Yes       Reviewed findings at length with patient.  Recommended repeat office cystoscopy in 3 months with Dr. Alvarado followed by maintenance BCG course if supplies allow for it.  Will follow-up on urine for cytology.  If positive, he will be notified and scheduled for repeat cystoscopy under anesthesia and biopsy/TURBT.  He will also require upper tract repeat contrast-enhanced imaging 2025.         Orders This Visit:  Orders Placed This Encounter   Procedures    Cytology, fluids       Meds This Visit:  Requested Prescriptions      No prescriptions  requested or ordered in this encounter       Imaging & Referrals:  None     11/8/2024  Tanner Martinez MD               [1] No Known Allergies

## 2024-11-11 LAB — NON GYNE INTERPRETATION: NEGATIVE

## 2025-02-04 ENCOUNTER — LAB ENCOUNTER (OUTPATIENT)
Dept: LAB | Facility: HOSPITAL | Age: 55
End: 2025-02-04
Attending: UROLOGY
Payer: COMMERCIAL

## 2025-02-04 DIAGNOSIS — C67.9 MALIGNANT NEOPLASM OF URINARY BLADDER, UNSPECIFIED SITE (HCC): ICD-10-CM

## 2025-02-04 LAB
BILIRUB UR QL: NEGATIVE
CLARITY UR: CLEAR
GLUCOSE UR-MCNC: NORMAL MG/DL
HGB UR QL STRIP.AUTO: NEGATIVE
KETONES UR-MCNC: NEGATIVE MG/DL
LEUKOCYTE ESTERASE UR QL STRIP.AUTO: 25
NITRITE UR QL STRIP.AUTO: NEGATIVE
NON GYNE INTERPRETATION: NEGATIVE
PH UR: 5 [PH] (ref 5–8)
PROT UR-MCNC: NEGATIVE MG/DL
SP GR UR STRIP: 1.03 (ref 1–1.03)
UROBILINOGEN UR STRIP-ACNC: NORMAL

## 2025-02-04 PROCEDURE — 88108 CYTOPATH CONCENTRATE TECH: CPT

## 2025-02-04 PROCEDURE — 81001 URINALYSIS AUTO W/SCOPE: CPT

## 2025-02-04 PROCEDURE — 87086 URINE CULTURE/COLONY COUNT: CPT

## 2025-02-07 ENCOUNTER — PROCEDURE (OUTPATIENT)
Dept: SURGERY | Facility: CLINIC | Age: 55
End: 2025-02-07
Payer: COMMERCIAL

## 2025-02-07 ENCOUNTER — TELEPHONE (OUTPATIENT)
Dept: SURGERY | Facility: CLINIC | Age: 55
End: 2025-02-07

## 2025-02-07 VITALS — SYSTOLIC BLOOD PRESSURE: 141 MMHG | DIASTOLIC BLOOD PRESSURE: 95 MMHG | HEART RATE: 80 BPM

## 2025-02-07 DIAGNOSIS — C67.9 MALIGNANT NEOPLASM OF URINARY BLADDER, UNSPECIFIED SITE (HCC): Primary | ICD-10-CM

## 2025-02-07 PROCEDURE — 3077F SYST BP >= 140 MM HG: CPT | Performed by: UROLOGY

## 2025-02-07 PROCEDURE — 3080F DIAST BP >= 90 MM HG: CPT | Performed by: UROLOGY

## 2025-02-07 PROCEDURE — 52000 CYSTOURETHROSCOPY: CPT | Performed by: UROLOGY

## 2025-02-07 NOTE — TELEPHONE ENCOUNTER
Pt is scheduled with Chelsea ROUSSEAU for 3 BCG starting 4/2/25 prior maybe needed.       PLAN:    BCG to start in April 2025 x3  Cystoscopy in 3 months with cytology prior  CTU in May 2025            Electronically signed by Maggie Alvarado MD at 2/7/2025 11:34 AM

## 2025-02-07 NOTE — PROCEDURES
CYSTOSCOPY (MALE)     PRE-OP DIAGNOSIS: HgT1/CIS     POST-OP DIAGNOSIS: same     PROCEDURE: Cystsocopy     SURGEON: Maggie Alvarado MD     ASSISTANT: none cystoscopy     EBL: minimal     FINDINGS:   Urethra: No meatal stenosis, no anterior or posterior urethral strictures, open bladder neck   Prostate: Bilobar coaptation with mild intravesical component   Bladder: Bilateral ureteral orifices in orthotopic position with efflux, no suspicious or concerning erythematous lesions, no papillary bladder tumors, no stones   Retroflexion: Mild intravesical component of prostate   Other findings:      INDICATIONS: HgT1 in 2023. Repeat TUR with HgTa, T1 and CIS. Offered cystectomy, declined. Underwent BCG. First surveillance cystoscopy with HgTa recurrence. He underwent re-induction BCG. Had TURBT which was negative. Had maintenance BCG #1 May 2024, then BCG in 2024. Last cysto was 2024 which was negative.      PROCEDURE: Patient was brought to the procedure suite and a timeout was performed identifying the patient,  and procedure being performed.  The risks of the procedure were once again detailed to the patient including bleeding, infection, dysuria.  The patient agreed to proceed.  The patient had a negative urinalysis.  No antibiotics were given to patient prior to this procedure.      He was placed in a supine position on the table and a flexible cystoscope was inserted per urethra.  There were no obvious urethral strictures in the anterior, membranous or posterior urethra.  The prostate appeared small.  Once in the bladder we performed a full diagnostic/surveillance cystoscopy which demonstrated no abnormalities.  On retroflexion we noted no abnormalities.  The scope was then carefully removed and once again no urethral abnormalities were noted.    There were no complications after this procedure and the patient tolerated the procedure without issue.    IMPRESSION: HgT1/CIS/Ta with negative cysto. Will start  maintenance BCG #2 in August.  Will need a cystoscopy in 3 months with cytology prior.  Cytology will be sent today.  Plan will stick as long as cytology is negative     PLAN:    BCG to start in April 2025 x3  Cystoscopy in 3 months with cytology prior  CTU in May 2025

## 2025-03-07 ENCOUNTER — PATIENT MESSAGE (OUTPATIENT)
Dept: SURGERY | Facility: CLINIC | Age: 55
End: 2025-03-07

## 2025-03-07 NOTE — TELEPHONE ENCOUNTER
Prior authorization started on [employer] website and Received : No prior authorization needed for code , transaction id: [id number]    Placed document in scanning.       Also consent generated and printed.

## 2025-03-10 ENCOUNTER — LAB ENCOUNTER (OUTPATIENT)
Dept: LAB | Facility: HOSPITAL | Age: 55
End: 2025-03-10
Attending: INTERNAL MEDICINE
Payer: COMMERCIAL

## 2025-03-10 DIAGNOSIS — E29.1 MALE HYPOGONADISM: ICD-10-CM

## 2025-03-10 DIAGNOSIS — E55.9 VITAMIN D DEFICIENCY: ICD-10-CM

## 2025-03-10 DIAGNOSIS — E53.8 VITAMIN B12 DEFICIENCY (NON ANEMIC): ICD-10-CM

## 2025-03-10 DIAGNOSIS — E78.5 HYPERLIPIDEMIA: ICD-10-CM

## 2025-03-10 DIAGNOSIS — Z12.5 SPECIAL SCREENING, PROSTATE CANCER: ICD-10-CM

## 2025-03-10 DIAGNOSIS — E11.8 DIABETIC COMPLICATION (HCC): Primary | ICD-10-CM

## 2025-03-10 LAB
ALBUMIN SERPL-MCNC: 4.6 G/DL (ref 3.2–4.8)
ALBUMIN/GLOB SERPL: 1.7 {RATIO} (ref 1–2)
ALP LIVER SERPL-CCNC: 53 U/L
ALT SERPL-CCNC: 37 U/L
ANION GAP SERPL CALC-SCNC: 8 MMOL/L (ref 0–18)
AST SERPL-CCNC: 21 U/L (ref ?–34)
BASOPHILS # BLD AUTO: 0.05 X10(3) UL (ref 0–0.2)
BASOPHILS NFR BLD AUTO: 0.6 %
BILIRUB SERPL-MCNC: 0.5 MG/DL (ref 0.3–1.2)
BILIRUB UR QL: NEGATIVE
BUN BLD-MCNC: 14 MG/DL (ref 9–23)
BUN/CREAT SERPL: 14 (ref 10–20)
CALCIUM BLD-MCNC: 9.6 MG/DL (ref 8.7–10.4)
CHLORIDE SERPL-SCNC: 103 MMOL/L (ref 98–112)
CHOLEST SERPL-MCNC: 148 MG/DL (ref ?–200)
CLARITY UR: CLEAR
CO2 SERPL-SCNC: 26 MMOL/L (ref 21–32)
COLOR UR: YELLOW
COMPLEXED PSA SERPL-MCNC: 1.01 NG/ML (ref ?–4)
CREAT BLD-MCNC: 1 MG/DL
CREAT UR-SCNC: 184.4 MG/DL
DEPRECATED RDW RBC AUTO: 46.9 FL (ref 35.1–46.3)
EGFRCR SERPLBLD CKD-EPI 2021: 88 ML/MIN/1.73M2 (ref 60–?)
EOSINOPHIL # BLD AUTO: 0.16 X10(3) UL (ref 0–0.7)
EOSINOPHIL NFR BLD AUTO: 1.9 %
ERYTHROCYTE [DISTWIDTH] IN BLOOD BY AUTOMATED COUNT: 14.4 % (ref 11–15)
EST. AVERAGE GLUCOSE BLD GHB EST-MCNC: 151 MG/DL (ref 68–126)
FASTING PATIENT LIPID ANSWER: YES
FASTING STATUS PATIENT QL REPORTED: YES
FOLATE SERPL-MCNC: 16.5 NG/ML (ref 5.4–?)
GLOBULIN PLAS-MCNC: 2.7 G/DL (ref 2–3.5)
GLUCOSE BLD-MCNC: 92 MG/DL (ref 70–99)
GLUCOSE UR-MCNC: NORMAL MG/DL
HBA1C MFR BLD: 6.9 % (ref ?–5.7)
HCT VFR BLD AUTO: 48.6 %
HDLC SERPL-MCNC: 31 MG/DL (ref 40–59)
HGB BLD-MCNC: 15.6 G/DL
HGB UR QL STRIP.AUTO: NEGATIVE
IMM GRANULOCYTES # BLD AUTO: 0.04 X10(3) UL (ref 0–1)
IMM GRANULOCYTES NFR BLD: 0.5 %
KETONES UR-MCNC: NEGATIVE MG/DL
LDLC SERPL CALC-MCNC: 88 MG/DL (ref ?–100)
LEUKOCYTE ESTERASE UR QL STRIP.AUTO: 75
LYMPHOCYTES # BLD AUTO: 2.65 X10(3) UL (ref 1–4)
LYMPHOCYTES NFR BLD AUTO: 31.9 %
MCH RBC QN AUTO: 28.9 PG (ref 26–34)
MCHC RBC AUTO-ENTMCNC: 32.1 G/DL (ref 31–37)
MCV RBC AUTO: 90.2 FL
MICROALBUMIN UR-MCNC: 1.3 MG/DL
MICROALBUMIN/CREAT 24H UR-RTO: 7 UG/MG (ref ?–30)
MONOCYTES # BLD AUTO: 0.63 X10(3) UL (ref 0.1–1)
MONOCYTES NFR BLD AUTO: 7.6 %
NEUTROPHILS # BLD AUTO: 4.79 X10 (3) UL (ref 1.5–7.7)
NEUTROPHILS # BLD AUTO: 4.79 X10(3) UL (ref 1.5–7.7)
NEUTROPHILS NFR BLD AUTO: 57.5 %
NITRITE UR QL STRIP.AUTO: NEGATIVE
NONHDLC SERPL-MCNC: 117 MG/DL (ref ?–130)
OSMOLALITY SERPL CALC.SUM OF ELEC: 284 MOSM/KG (ref 275–295)
PH UR: 5.5 [PH] (ref 5–8)
PLATELET # BLD AUTO: 305 10(3)UL (ref 150–450)
POTASSIUM SERPL-SCNC: 3.7 MMOL/L (ref 3.5–5.1)
PROT SERPL-MCNC: 7.3 G/DL (ref 5.7–8.2)
PROT UR-MCNC: NEGATIVE MG/DL
RBC # BLD AUTO: 5.39 X10(6)UL
SODIUM SERPL-SCNC: 137 MMOL/L (ref 136–145)
SP GR UR STRIP: 1.03 (ref 1–1.03)
T4 FREE SERPL-MCNC: 1.1 NG/DL (ref 0.8–1.7)
TRIGL SERPL-MCNC: 163 MG/DL (ref 30–149)
TSI SER-ACNC: 2.28 UIU/ML (ref 0.55–4.78)
URATE SERPL-MCNC: 5.5 MG/DL
UROBILINOGEN UR STRIP-ACNC: NORMAL
VIT B12 SERPL-MCNC: 437 PG/ML (ref 211–911)
VIT D+METAB SERPL-MCNC: 30.7 NG/ML (ref 30–100)
VLDLC SERPL CALC-MCNC: 26 MG/DL (ref 0–30)
WBC # BLD AUTO: 8.3 X10(3) UL (ref 4–11)

## 2025-03-10 PROCEDURE — 83921 ORGANIC ACID SINGLE QUANT: CPT

## 2025-03-10 PROCEDURE — 82570 ASSAY OF URINE CREATININE: CPT

## 2025-03-10 PROCEDURE — 82043 UR ALBUMIN QUANTITATIVE: CPT

## 2025-03-10 PROCEDURE — 84439 ASSAY OF FREE THYROXINE: CPT

## 2025-03-10 PROCEDURE — 82306 VITAMIN D 25 HYDROXY: CPT

## 2025-03-10 PROCEDURE — 84550 ASSAY OF BLOOD/URIC ACID: CPT

## 2025-03-10 PROCEDURE — 81001 URINALYSIS AUTO W/SCOPE: CPT

## 2025-03-10 PROCEDURE — 36415 COLL VENOUS BLD VENIPUNCTURE: CPT

## 2025-03-10 PROCEDURE — 83036 HEMOGLOBIN GLYCOSYLATED A1C: CPT

## 2025-03-10 PROCEDURE — 82746 ASSAY OF FOLIC ACID SERUM: CPT

## 2025-03-10 PROCEDURE — 82607 VITAMIN B-12: CPT

## 2025-03-10 PROCEDURE — 80061 LIPID PANEL: CPT

## 2025-03-10 PROCEDURE — 84443 ASSAY THYROID STIM HORMONE: CPT

## 2025-03-10 PROCEDURE — 85025 COMPLETE CBC W/AUTO DIFF WBC: CPT

## 2025-03-10 PROCEDURE — 80053 COMPREHEN METABOLIC PANEL: CPT

## 2025-03-14 LAB — METHYLMALONIC ACID: 127 NMOL/L

## 2025-03-31 ENCOUNTER — LAB ENCOUNTER (OUTPATIENT)
Dept: LAB | Facility: HOSPITAL | Age: 55
End: 2025-03-31
Attending: UROLOGY
Payer: COMMERCIAL

## 2025-03-31 DIAGNOSIS — C67.9 MALIGNANT NEOPLASM OF URINARY BLADDER, UNSPECIFIED SITE (HCC): ICD-10-CM

## 2025-03-31 LAB
BILIRUB UR QL: NEGATIVE
CLARITY UR: CLEAR
GLUCOSE UR-MCNC: NORMAL MG/DL
KETONES UR-MCNC: NEGATIVE MG/DL
LEUKOCYTE ESTERASE UR QL STRIP.AUTO: 25
NITRITE UR QL STRIP.AUTO: NEGATIVE
PH UR: 5.5 [PH] (ref 5–8)
PROT UR-MCNC: NEGATIVE MG/DL
SP GR UR STRIP: 1.03 (ref 1–1.03)
UROBILINOGEN UR STRIP-ACNC: NORMAL

## 2025-03-31 PROCEDURE — 81001 URINALYSIS AUTO W/SCOPE: CPT

## 2025-03-31 PROCEDURE — 87086 URINE CULTURE/COLONY COUNT: CPT

## 2025-03-31 NOTE — PROGRESS NOTES
[facility] UROLOGY  INTRAVESICAL BCG INSTILLATION PROCEDURE    Name: Sharan Finnegan  MRN: [id number]  : 1970    Pre-Procedural Diagnosis: Bladder cancer  Post-Procedural Diagnosis: Bladder cancer    The patient comes in for a BCG instillation today. This would be the patient's # 1/3 instillations. Today the patient has no symptoms to suggest urinary tract infection, such as urinary frequency, urgency or dysuria or gross hematuria or fevers or chills or flank pain. The patient voided clear-yellow urine. Urinalysis on  shows 25 leukocytes and 6-10 WBC. Urine culture was negative.      I discussed with the patient that there are no contraindications to receiving a BCG instillation today. I explained the benefits, risks, complications and side effects of the procedure. The patient understands and wishes to proceed.  A 25 mg dose of BCG in 50 cc of sterile normal saline was prepared by [facility] pharmacy and transported to our office.  I confirmed with the patient and with nurse in attendance, that the label on the medication was correct and designated for this patient. The patient is prepped and draped and catheterized atraumatically with a 14 Setswana red rubber catheter. The urine returned clear-yellow without any blood. The medication described above was instilled by gravity into the bladder and the catheter was removed.  No blood was noted on the catheter. The catheter and all supplies are disposed of following standard BCG protocol precautions. The patient tolerated the procedure well without any immediate complications. They will call us if there are any problems. I answered all of the patient's questions. The patient will return next week tentatively for another instillation.      Chelsea Smith, AIME  24

## 2025-04-02 ENCOUNTER — PROCEDURE (OUTPATIENT)
Dept: SURGERY | Facility: CLINIC | Age: 55
End: 2025-04-02
Payer: COMMERCIAL

## 2025-04-02 VITALS — SYSTOLIC BLOOD PRESSURE: 126 MMHG | DIASTOLIC BLOOD PRESSURE: 76 MMHG

## 2025-04-02 DIAGNOSIS — C67.9 MALIGNANT NEOPLASM OF URINARY BLADDER, UNSPECIFIED SITE (HCC): Primary | ICD-10-CM

## 2025-04-02 PROCEDURE — 3078F DIAST BP <80 MM HG: CPT

## 2025-04-02 PROCEDURE — 51720 TREATMENT OF BLADDER LESION: CPT

## 2025-04-02 PROCEDURE — 3074F SYST BP LT 130 MM HG: CPT

## 2025-04-02 NOTE — PROGRESS NOTES
-Pt presents to Urology for #1/3 BCG bladder installation; identity verified with name & .  -Consent signed & sent to Scanning.  -Positions self on exam table; draped for privacy to lower shorts; prep penis tip w/ betadine; instill 2% lido-gel 2-minutes for pain management; insert 14Fr coude straight catheter w/ urine return; Timeout/ installation/ catheter removal per SKYLAR Smith APN;  pt redressed self.  -Encounter complete.

## 2025-04-02 NOTE — PROGRESS NOTES
[facility] UROLOGY  INTRAVESICAL BCG INSTILLATION PROCEDURE    Name: Sharan Finnegan  MRN: [id number]  : 1970    Pre-Procedural Diagnosis: Bladder cancer  Post-Procedural Diagnosis: Bladder cancer    The patient comes in for a BCG instillation today. This would be the patient's # 2/3 instillations. Today the patient has no symptoms to suggest urinary tract infection, such as urinary frequency, urgency or dysuria or gross hematuria or fevers or chills or flank pain. The patient voided clear-yellow urine. Urinalysis on  shows 75 leukocytes and 21-50 WBC. Urine culture was negative.      I discussed with the patient that there are no contraindications to receiving a BCG instillation today. I explained the benefits, risks, complications and side effects of the procedure. The patient understands and wishes to proceed.  A 25 mg dose of BCG in 50 cc of sterile normal saline was prepared by [facility] pharmacy and transported to our office.  I confirmed with the patient and with nurse in attendance, that the label on the medication was correct and designated for this patient. The patient is prepped and draped and catheterized atraumatically with a 14 German red rubber catheter. The urine returned clear-yellow without any blood. The medication described above was instilled by gravity into the bladder and the catheter was removed.  No blood was noted on the catheter. The catheter and all supplies are disposed of following standard BCG protocol precautions. The patient tolerated the procedure well without any immediate complications. They will call us if there are any problems. I answered all of the patient's questions. The patient will return next week tentatively for another instillation.      Chelsea Smith, AIME  25

## 2025-04-07 ENCOUNTER — LAB ENCOUNTER (OUTPATIENT)
Dept: LAB | Facility: HOSPITAL | Age: 55
End: 2025-04-07
Attending: UROLOGY
Payer: COMMERCIAL

## 2025-04-07 DIAGNOSIS — C67.9 MALIGNANT NEOPLASM OF URINARY BLADDER, UNSPECIFIED SITE (HCC): ICD-10-CM

## 2025-04-07 LAB
BILIRUB UR QL: NEGATIVE
CLARITY UR: CLEAR
COLOR UR: YELLOW
GLUCOSE UR-MCNC: NORMAL MG/DL
HGB UR QL STRIP.AUTO: NEGATIVE
KETONES UR-MCNC: NEGATIVE MG/DL
LEUKOCYTE ESTERASE UR QL STRIP.AUTO: 75
NITRITE UR QL STRIP.AUTO: NEGATIVE
PH UR: 5.5 [PH] (ref 5–8)
SP GR UR STRIP: 1.03 (ref 1–1.03)
UROBILINOGEN UR STRIP-ACNC: NORMAL

## 2025-04-07 PROCEDURE — 87086 URINE CULTURE/COLONY COUNT: CPT

## 2025-04-07 PROCEDURE — 81001 URINALYSIS AUTO W/SCOPE: CPT

## 2025-04-09 ENCOUNTER — PROCEDURE (OUTPATIENT)
Dept: SURGERY | Facility: CLINIC | Age: 55
End: 2025-04-09
Payer: COMMERCIAL

## 2025-04-09 VITALS — DIASTOLIC BLOOD PRESSURE: 81 MMHG | RESPIRATION RATE: 16 BRPM | HEART RATE: 94 BPM | SYSTOLIC BLOOD PRESSURE: 137 MMHG

## 2025-04-09 DIAGNOSIS — C67.9 MALIGNANT NEOPLASM OF URINARY BLADDER, UNSPECIFIED SITE (HCC): Primary | ICD-10-CM

## 2025-04-09 PROCEDURE — 3075F SYST BP GE 130 - 139MM HG: CPT

## 2025-04-09 PROCEDURE — 51720 TREATMENT OF BLADDER LESION: CPT

## 2025-04-09 PROCEDURE — 3079F DIAST BP 80-89 MM HG: CPT

## 2025-04-09 NOTE — PROGRESS NOTES
[facility] UROLOGY  INTRAVESICAL BCG INSTILLATION PROCEDURE    Name: Sharan Finnegan  MRN: [id number]  : 1970    Pre-Procedural Diagnosis: Bladder cancer  Post-Procedural Diagnosis: Bladder cancer    The patient comes in for a BCG instillation today. This would be the patient's # 3/3 instillations. Today the patient has no symptoms to suggest urinary tract infection, such as urinary frequency, urgency or dysuria or gross hematuria or fevers or chills or flank pain. The patient voided clear-yellow urine. Urinalysis on 2025 shows negative blood, negative nitrite, 75 leuk esterase, 21-50WBC, 0-2 RBC, no bacteria. Urine culture shows <10,000 cfu/ml multiple species present, probable contamination.    I discussed with the patient that there are no contraindications to receiving a BCG instillation today. I explained the benefits, risks, complications and side effects of the procedure. The patient understands and wishes to proceed.  A 25 mg dose of BCG in 50 cc of sterile normal saline was prepared by [facility] pharmacy and transported to our office.  I confirmed with the patient and with nurse in attendance, that the label on the medication was correct and designated for this patient. The patient is prepped and draped and catheterized atraumatically with a 14 Tajik red rubber catheter. The urine returned clear-yellow without any blood. The medication described above was instilled by gravity into the bladder and the catheter was removed.  No blood was noted on the catheter. The catheter and all supplies are disposed of following standard BCG protocol precautions. The patient tolerated the procedure well without any immediate complications. They will call us if there are any problems. I answered all of the patient's questions. The patient will return on 2025 for his appointment with Dr. Alvarado.      Thiago Chamberlain PA-C    25

## 2025-04-14 ENCOUNTER — LAB ENCOUNTER (OUTPATIENT)
Dept: LAB | Facility: HOSPITAL | Age: 55
End: 2025-04-14
Attending: UROLOGY
Payer: COMMERCIAL

## 2025-04-14 DIAGNOSIS — C67.9 MALIGNANT NEOPLASM OF URINARY BLADDER, UNSPECIFIED SITE (HCC): ICD-10-CM

## 2025-04-14 PROCEDURE — 87086 URINE CULTURE/COLONY COUNT: CPT

## 2025-04-14 PROCEDURE — 81001 URINALYSIS AUTO W/SCOPE: CPT

## 2025-04-16 ENCOUNTER — PROCEDURE (OUTPATIENT)
Dept: SURGERY | Facility: CLINIC | Age: 55
End: 2025-04-16
Payer: COMMERCIAL

## 2025-04-16 VITALS — SYSTOLIC BLOOD PRESSURE: 113 MMHG | RESPIRATION RATE: 16 BRPM | HEART RATE: 85 BPM | DIASTOLIC BLOOD PRESSURE: 68 MMHG

## 2025-04-16 DIAGNOSIS — C67.9 MALIGNANT NEOPLASM OF URINARY BLADDER, UNSPECIFIED SITE (HCC): Primary | ICD-10-CM

## 2025-04-16 PROCEDURE — 3074F SYST BP LT 130 MM HG: CPT | Performed by: PHYSICIAN ASSISTANT

## 2025-04-16 PROCEDURE — 51720 TREATMENT OF BLADDER LESION: CPT | Performed by: PHYSICIAN ASSISTANT

## 2025-04-16 PROCEDURE — 3078F DIAST BP <80 MM HG: CPT | Performed by: PHYSICIAN ASSISTANT

## 2025-04-16 NOTE — PROGRESS NOTES
Confirmed with patient that she dropped off the DMV form today and it is due in November but would like it sent in as soon as possible.     Kylah Katz RN  Endocrine Care Coordinator  [facility]     I determined that pt's urine test results were acceptable and that NO PA was needed. I gathered my supplies and called the pt into the exam room and I verified his name and . I explained that I will be placing the cath for his BCG 25 mg # 3/3 and ZURI RODRIGUEZ  will be instilling the med. Pt agreed to proceed. I then asked the pt to step up onto the exam table and lower his bottom clothing to his ankles. I gave pt a sheet to cover himself. I then placed the pt in supine position on the exam table and proceeded to prep and drape the pt in sterile fashion and I proceeded to instill about 2/3 of the Urojet (Lidocaine HCL 2 %- 10 ml ) into the pt's urethra and I then constricted the head of the penis for about 1 min to allow sufficient numbing. I then proceeded to insert a 14 Fr coude tipped catheter into the bladder and received about 100 ml of light rosalio colored urine. I went out of the room to get ZURI RODRIGUEZ  and we returned to the room together and we then conducted the timeout and he then instilled the BCG 25 mg without incident and pt tolerated it well. KK then removed the catheter and all of the chemo waste was disposed of into the yellow chemo bag. I gave pt wipes to clean up and told him to redress and that I would return in a minute. I disposed of the chemo bag into the yellow chemo bucket in the dirty utility room. I went back to the room where the pt was and reviewed the BCG aftercare instructions. I asked pt to return next week for his next TX . Pt verbalized understanding and compliance and I sent him on his way. I then cleaned the exam room with the orange topped bleach wipes as per protocol

## 2025-04-25 ENCOUNTER — TELEPHONE (OUTPATIENT)
Dept: SURGERY | Facility: CLINIC | Age: 55
End: 2025-04-25

## 2025-04-25 NOTE — TELEPHONE ENCOUNTER
We have received a fax from patients insurance with an approval notice for patient upcoming CT Scan. Notice will be placed in scanning bin to be uploaded into patients chart.     Dates approved: 04/24/2025-06/22/2025  Procedure: 13360 CT Scan abdomen and pelvis before and after contrast.  Requested ID: [id number]

## 2025-05-02 ENCOUNTER — HOSPITAL ENCOUNTER (OUTPATIENT)
Dept: CT IMAGING | Facility: HOSPITAL | Age: 55
Discharge: HOME | End: 2025-05-02
Attending: UROLOGY
Payer: COMMERCIAL

## 2025-05-02 DIAGNOSIS — C67.9 MALIGNANT NEOPLASM OF URINARY BLADDER, UNSPECIFIED SITE (HCC): ICD-10-CM

## 2025-05-02 LAB
CREAT BLD-MCNC: 1 MG/DL (ref 0.7–1.3)
EGFRCR SERPLBLD CKD-EPI 2021: 88 ML/MIN/1.73M2 (ref 60–?)

## 2025-05-02 PROCEDURE — 82565 ASSAY OF CREATININE: CPT

## 2025-05-02 PROCEDURE — 74178 CT ABD&PLV WO CNTR FLWD CNTR: CPT | Performed by: UROLOGY

## 2025-05-05 ENCOUNTER — TELEPHONE (OUTPATIENT)
Dept: SURGERY | Facility: CLINIC | Age: 55
End: 2025-05-05

## 2025-05-05 NOTE — TELEPHONE ENCOUNTER
Spoke with patient assisted in rescheduling procedure. He is aware to arrive at 730 am.     Future Appointments   Date Time Provider Department Center   5/9/2025  8:00 AM Maggie Alvarado MD [facility]

## 2025-05-05 NOTE — TELEPHONE ENCOUNTER
Left message for patient.  will be out of office this Friday, and his procedure will need to be rescheduled.     KASH's if patient calls back, [phone].     [facility] I was planning to double book patient earlier that day or to another day.  will be done with clinic at 10:30 for this Friday.

## 2025-05-09 ENCOUNTER — PROCEDURE (OUTPATIENT)
Dept: SURGERY | Facility: CLINIC | Age: 55
End: 2025-05-09
Payer: COMMERCIAL

## 2025-05-09 VITALS — SYSTOLIC BLOOD PRESSURE: 119 MMHG | DIASTOLIC BLOOD PRESSURE: 80 MMHG | HEART RATE: 94 BPM

## 2025-05-09 DIAGNOSIS — C67.9 MALIGNANT NEOPLASM OF URINARY BLADDER, UNSPECIFIED SITE (HCC): Primary | ICD-10-CM

## 2025-05-09 PROCEDURE — 52000 CYSTOURETHROSCOPY: CPT | Performed by: UROLOGY

## 2025-05-09 PROCEDURE — 3074F SYST BP LT 130 MM HG: CPT | Performed by: UROLOGY

## 2025-05-09 PROCEDURE — 3079F DIAST BP 80-89 MM HG: CPT | Performed by: UROLOGY

## 2025-05-09 NOTE — PROCEDURES
CYSTOSCOPY (MALE)     PRE-OP DIAGNOSIS: HgT1/CIS     POST-OP DIAGNOSIS: same     PROCEDURE: Cystsocopy     SURGEON: Maggie Alvarado MD     ASSISTANT: none cystoscopy     EBL: minimal     FINDINGS:   Urethra: No meatal stenosis, no anterior or posterior urethral strictures, open bladder neck   Prostate: Bilobar coaptation with mild intravesical component   Bladder: Bilateral ureteral orifices in orthotopic position with efflux, no suspicious or concerning erythematous lesions, no papillary bladder tumors, no stones   Retroflexion: Mild intravesical component of prostate   Other findings:      INDICATIONS: HgT1 in 2023. Repeat TUR with HgTa, T1 and CIS. Offered cystectomy, declined. Underwent BCG. First surveillance cystoscopy with HgTa recurrence. He underwent re-induction BCG. Had TURBT which was negative. Had maintenance BCG #1 May 2024, then BCG in 2024. Last cysto was 2024 which was negative.      PROCEDURE: Patient was brought to the procedure suite and a timeout was performed identifying the patient,  and procedure being performed.  The risks of the procedure were once again detailed to the patient including bleeding, infection, dysuria.  The patient agreed to proceed.  The patient had a negative urinalysis.  No antibiotics were given to patient prior to this procedure.      He was placed in a supine position on the table and a flexible cystoscope was inserted per urethra.  There were no obvious urethral strictures in the anterior, membranous or posterior urethra.  The prostate appeared small.  Once in the bladder we performed a full diagnostic/surveillance cystoscopy which demonstrated no abnormalities.  On retroflexion we noted no abnormalities.  The scope was then carefully removed and once again no urethral abnormalities were noted.    There were no complications after this procedure and the patient tolerated the procedure without issue.    IMPRESSION: HgT1/CIS/Ta with negative cysto.  Will need  a cystoscopy in 3 months with cytology prior.  Cytology will be sent today.  Will start maintenance BCG #4 in October. Plan will stick as long as cytology is negative     PLAN:    BCG to start in October 2025 x3 (BCG #1 was in 11/2023)  Cystoscopy in 3 months with cytology prior  CTU in May 2025 - pending will follow up results  Will need a CTU again in 2-3 years (May 2027)

## 2025-05-12 LAB — NON GYNE INTERPRETATION: NEGATIVE

## 2025-08-08 ENCOUNTER — PROCEDURE (OUTPATIENT)
Dept: SURGERY | Facility: CLINIC | Age: 55
End: 2025-08-08

## 2025-08-08 DIAGNOSIS — C67.9 MALIGNANT NEOPLASM OF URINARY BLADDER, UNSPECIFIED SITE (HCC): ICD-10-CM

## 2025-08-08 DIAGNOSIS — D49.4 BLADDER TUMOR: Primary | ICD-10-CM

## 2025-08-08 PROCEDURE — 52000 CYSTOURETHROSCOPY: CPT | Performed by: UROLOGY

## 2025-08-11 LAB — NON GYNE INTERPRETATION: NEGATIVE

## (undated) DEVICE — MEDI-VAC NON-CONDUCTIVE SUCTION TUBING: Brand: CARDINAL HEALTH

## (undated) DEVICE — CUTTING LOOP, BIPOLAR, 0.30MM, 24/26 FR.: Brand: N.A.

## (undated) DEVICE — STERILE WATER 1000ML BTL

## (undated) DEVICE — BAG DRAIN INFECTION CNTRL 2000

## (undated) DEVICE — SOLUTION  .9 3000ML

## (undated) DEVICE — MEGADYNE ELECTRODE ADULT PT RT

## (undated) DEVICE — ENDOSCOPIC VALVE WITH ADAPTER.: Brand: SURSEAL® II

## (undated) DEVICE — INTENDED USE FOR SURGICAL MARKING ON INTACT SKIN, ALSO PROVIDES A PERMANENT METHOD OF IDENTIFYING OBJECTS IN THE OPERATING ROOM: Brand: WRITESITE® PLUS MINI PREP RESISTANT MARKER

## (undated) DEVICE — SOLUTION IRRIG 3000ML 0.9% NACL FLX CONT

## (undated) DEVICE — CATH URTH BDX IC 20FR FL 3

## (undated) DEVICE — SOLUTION IRRIG 1000ML ST H2O AQUALITE PLAS

## (undated) DEVICE — CUTTING LOOP, BIPOLAR 24/26FR LOGITUDINL: Brand: N.A.

## (undated) DEVICE — GAMMEX® PI HYBRID SIZE 8, STERILE POWDER-FREE SURGICAL GLOVE, POLYISOPRENE AND NEOPRENE BLEND: Brand: GAMMEX

## (undated) DEVICE — CYSTO PACK: Brand: MEDLINE INDUSTRIES, INC.

## (undated) DEVICE — PAD,NON-ADHERENT,3X8,STERILE,LF,1/PK: Brand: MEDLINE

## (undated) DEVICE — GAMMEX® PI HYBRID SIZE 6, STERILE POWDER-FREE SURGICAL GLOVE, POLYISOPRENE AND NEOPRENE BLEND: Brand: GAMMEX

## (undated) DEVICE — ELECTRODE ES RET 2 PATE W/ 10FT CRD MPLR DISP

## (undated) DEVICE — CURAD NONADHERANT PAD 3X8

## (undated) NOTE — LETTER
Printed: 2024    Patient Name: Sharan Finnegan  : 1970   Medical Record #: [id number]    Consent to Cancer Treatment    I, Sharan Finnegan, understand that I have been diagnosed with Bladder Cancer.     I understand that the treatment suggested by my doctor, Maggie Alvarado MD will involve:    Drug(s): BCG  Frequency/Schedule: Weekly   Expected length of treatment: 3    The benefit/goal of my treatment is to lower the probability of my bladder cancer recurring.    I understand the possible benefits of this treatment if it is successful. I also understand that my doctors cannot guarantee that the treatment will help me.    I understand that the medication(s) recommended by my doctor can have short-term and long-term effects.      My doctor talked to me about the following side effects that I might experience because of my treatment: possible reduction in size of bladder, possible more frequent urination, possible burning with urination, or possible bladder pain.    If the medication to be administered is BCG, I understand that BCG can cause severe infection that if not treated could be life threatening.  I understand that I could have side effects from my treatment that are not listed on this form. Each patient can respond differently to medications, and could have side effects that have not been reported by others.     Alternatives to this treatment have been explained to me and could include the option of no treatment. I also understand that I may stop this treatment at any time.    I have had the chance to ask questions about this treatment, and my questions have been answered to my satisfaction.  I understand that I can contact my urologist, urology nurses; or my Cancer Team at any time if I have questions, by calling [phone].   Additional written information will be given to me prior to start of therapy.    Additionally, I will receive a copy of this consent form.    I have read and fully  understand this consent to cancer treatment. I acknowledge that the explanations above were made. The physicians have answered all my questions and I consent to the use of Medication as noted above.        Date:_____  Time:________ Signed:_________________________________        Patient/Legally Authorized Representative        Date:______ Time:________ Signed:_________________________________        Witness (employee of hospital)        CERTIFICATION OF INTERPRETATION (non-English language only)  I certify that I have read the foregoing to the signer hereof in ___________ language.    Date:____ Time:___________    Name and ID for language line:___________________________________________    PHYSICIAN AFFIRMATION/DECLARATION  I have discussed the nature, purpose and risks of cancer treatment, alternative methods of treatment and the possibility of complications, with the patient and/or the patient’s authorized representative. I have given the opportunity to ask questions and have answered any such questions.      Date:______  Time:________ Signed:_________________________________      Patient Name: Sharan Finnegan  : 1970   Medical Record #: [id number]

## (undated) NOTE — LETTER
Printed: 3/7/2025    Patient Name: Sharan Finnegan  : 1970   Medical Record #: [id number]    Consent to Cancer Treatment    I, Sharan Finnegan, understand that I have been diagnosed with Bladder Cancer.   I understand that the treatment suggested by my doctor, Maggie Alvarado MD will involve:    Drug(s): BCG  Frequency/Schedule: Weekly  Expected length of treatment: 3    The benefit/goal of my treatment is to lower the probability of my bladder cancer recurring.    I understand the possible benefits of this treatment if it is successful. I also understand that my doctors cannot guarantee that the treatment will help me.    I understand that the medication(s) recommended by my doctor can have short-term and long-term effects.      My doctor talked to me about the following side effects that I might experience because of my treatment: possible reduction in size of bladder, possible more frequent urination, possible burning with urination, or possible bladder pain.    If the medication to be administered is BCG, I understand that BCG can cause severe infection that if not treated could be life threatening.  I understand that I could have side effects from my treatment that are not listed on this form. Each patient can respond differently to medications, and could have side effects that have not been reported by others.     Alternatives to this treatment have been explained to me and could include the option of no treatment. I also understand that I may stop this treatment at any time.    I have had the chance to ask questions about this treatment, and my questions have been answered to my satisfaction.  I understand that I can contact my urologist, urology nurses; or my Cancer Team at any time if I have questions, by calling [phone].   Additional written information will be given to me prior to start of therapy.    Additionally, I will receive a copy of this consent form.    I have read and fully  understand this consent to cancer treatment. I acknowledge that the explanations above were made. The physicians have answered all my questions and I consent to the use of Medication as noted above.        Date:_____  Time:________ Signed:_________________________________        Patient/Legally Authorized Representative        Date:______ Time:________ Signed:_________________________________        Witness (employee of hospital)        CERTIFICATION OF INTERPRETATION (non-English language only)  I certify that I have read the foregoing to the signer hereof in ___________ language.    Date:____ Time:___________    Name and ID for language line:___________________________________________    PHYSICIAN AFFIRMATION/DECLARATION  I have discussed the nature, purpose and risks of cancer treatment, alternative methods of treatment and the possibility of complications, with the patient and/or the patient’s authorized representative. I have given the opportunity to ask questions and have answered any such questions.      Date:______  Time:________ Signed:_________________________________      Patient Name: Sharan Finnegan  : 1970   Medical Record #: [id number]